# Patient Record
Sex: FEMALE | Race: WHITE | NOT HISPANIC OR LATINO | URBAN - METROPOLITAN AREA
[De-identification: names, ages, dates, MRNs, and addresses within clinical notes are randomized per-mention and may not be internally consistent; named-entity substitution may affect disease eponyms.]

---

## 2017-01-05 ENCOUNTER — APPOINTMENT (RX ONLY)
Dept: URBAN - METROPOLITAN AREA CLINIC 126 | Facility: CLINIC | Age: 67
Setting detail: DERMATOLOGY
End: 2017-01-05

## 2017-01-05 DIAGNOSIS — Z41.9 ENCOUNTER FOR PROCEDURE FOR PURPOSES OTHER THAN REMEDYING HEALTH STATE, UNSPECIFIED: ICD-10-CM

## 2017-01-05 PROCEDURE — ? FACIAL

## 2017-01-05 PROCEDURE — ? CHEMICAL PEEL

## 2017-01-05 PROCEDURE — ? DERMAPLANE

## 2017-01-05 ASSESSMENT — LOCATION DETAILED DESCRIPTION DERM
LOCATION DETAILED: SUPERIOR THORACIC SPINE
LOCATION DETAILED: LEFT CENTRAL MALAR CHEEK
LOCATION DETAILED: LEFT INFERIOR CENTRAL MALAR CHEEK

## 2017-01-05 ASSESSMENT — LOCATION ZONE DERM
LOCATION ZONE: TRUNK
LOCATION ZONE: FACE

## 2017-01-05 ASSESSMENT — LOCATION SIMPLE DESCRIPTION DERM
LOCATION SIMPLE: LEFT CHEEK
LOCATION SIMPLE: UPPER BACK

## 2017-01-05 NOTE — PROCEDURE: FACIAL
Mask Type (Optional): hydrating B5
Detail Level: Zone
Exfoliation Type: enzyme
Facial Steaming: steamed
Treatment Type (Optional): Spa Facial
Extraction Method: extractor

## 2017-01-05 NOTE — PROCEDURE: DERMAPLANE
Post-Procedure Instructions: Following the dermaplane procedure, Oxymist treatment was applied to the treatment areas. Moisturizer and SPF was applied.
Treatment Areas: face
Blade: [place] scalpel
Pre-Procedure Text: The patient was placed in a recumbant position on the procedure table.
Treatment Area Prep: alcohol
Detail Level: Zone
Post-Care Instructions: I reviewed with the patient in detail post-care instructions.

## 2017-01-05 NOTE — PROCEDURE: CHEMICAL PEEL
Prep: The treated area was degreased with pre-peel cleanser, and vaseline was applied for protection of mucous membranes.
Detail Level: Zone
Treatment Time (Optional): 5 min
Treatment Number: 0
Post Peel Care: After the procedure, phyto correct was applied to the treated areas. Sun protection and post-care instructions were reviewed with the patient.
Chemical Peel: MicroPeel Sensitive 2
Post-Care Instructions: I reviewed with the patient in detail post-care instructions. Patient should avoid sun exposure and wear sun protection.
Consent: Prior to the procedure, written consent was obtained and risks were reviewed, including but not limited to: redness, peeling, blistering, pigmentary change, scarring, infection, and pain.

## 2017-01-23 ENCOUNTER — APPOINTMENT (RX ONLY)
Dept: URBAN - METROPOLITAN AREA CLINIC 126 | Facility: CLINIC | Age: 67
Setting detail: DERMATOLOGY
End: 2017-01-23

## 2017-01-23 DIAGNOSIS — Z41.9 ENCOUNTER FOR PROCEDURE FOR PURPOSES OTHER THAN REMEDYING HEALTH STATE, UNSPECIFIED: ICD-10-CM

## 2017-01-23 PROCEDURE — ? VI PEEL

## 2017-01-23 ASSESSMENT — LOCATION SIMPLE DESCRIPTION DERM
LOCATION SIMPLE: CHEST
LOCATION SIMPLE: LEFT CHEEK

## 2017-01-23 ASSESSMENT — LOCATION ZONE DERM
LOCATION ZONE: TRUNK
LOCATION ZONE: FACE

## 2017-01-23 ASSESSMENT — LOCATION DETAILED DESCRIPTION DERM
LOCATION DETAILED: MIDDLE STERNUM
LOCATION DETAILED: LEFT INFERIOR CENTRAL MALAR CHEEK

## 2017-01-23 NOTE — PROCEDURE: VI PEEL
Post Peel Care: After the procedure, the patient was instructed not to wash the treated area for 6-8 hours or manually remove dead skin when the peeling process starts. Patient may use OTC hydrocortisone cream for itching. Patient instructed to use the provided Retin-A wipes on the treated area on the 1st and 2nd nights.
Post-Care Instructions: I reviewed with the patient in detail post-care instructions. Patient should avoid sun exposure and wear sun protection.
Prep: The treated area was degreased with pre-peel cleanser, and vaseline was applied for protection of mucous membranes.
Treatment Number: 0
Consent: Prior to the procedure, written consent was obtained and risks were reviewed, including but not limited to: redness, peeling, blistering, pigmentary change, scarring, infection, and pain. Patient is aware multiple treatments may be necessary to achieve the desired outcome.
Detail Level: Zone
Chemical Peel: VI Peel

## 2017-02-02 ENCOUNTER — APPOINTMENT (RX ONLY)
Dept: URBAN - METROPOLITAN AREA CLINIC 126 | Facility: CLINIC | Age: 67
Setting detail: DERMATOLOGY
End: 2017-02-02

## 2017-02-02 DIAGNOSIS — Z41.9 ENCOUNTER FOR PROCEDURE FOR PURPOSES OTHER THAN REMEDYING HEALTH STATE, UNSPECIFIED: ICD-10-CM

## 2017-02-02 PROCEDURE — ? CHEMICAL PEEL

## 2017-02-02 PROCEDURE — ? CHEMICAL PEEL GLYCOLIC ACID

## 2017-02-02 PROCEDURE — ? DERMAPLANE

## 2017-02-02 ASSESSMENT — LOCATION ZONE DERM
LOCATION ZONE: FACE
LOCATION ZONE: TRUNK

## 2017-02-02 ASSESSMENT — LOCATION SIMPLE DESCRIPTION DERM
LOCATION SIMPLE: LEFT UPPER BACK
LOCATION SIMPLE: LEFT CHEEK

## 2017-02-02 ASSESSMENT — LOCATION DETAILED DESCRIPTION DERM
LOCATION DETAILED: LEFT MID-UPPER BACK
LOCATION DETAILED: LEFT MEDIAL BUCCAL CHEEK
LOCATION DETAILED: LEFT INFERIOR CENTRAL MALAR CHEEK

## 2017-02-02 NOTE — PROCEDURE: DERMAPLANE
Treatment Area Prep: alcohol
Post-Care Instructions: I reviewed with the patient in detail post-care instructions.
Pre-Procedure Text: The patient was placed in a recumbant position on the procedure table.
Blade: [place] scalpel
Treatment Areas: face
Post-Procedure Instructions: Following the dermaplane procedure, Oxymist treatment was applied to the treatment areas. Moisturizer and SPF was applied.
Detail Level: Zone

## 2017-02-02 NOTE — PROCEDURE: CHEMICAL PEEL
Prep: The treated area was degreased with pre-peel cleanser, and vaseline was applied for protection of mucous membranes.
Chemical Peel: MicroPeel Sensitive 2
Treatment Time (Optional): 5 min
Treatment Number: 0
Post-Care Instructions: I reviewed with the patient in detail post-care instructions. Patient should avoid sun exposure and wear sun protection.
Consent: Prior to the procedure, written consent was obtained and risks were reviewed, including but not limited to: redness, peeling, blistering, pigmentary change, scarring, infection, and pain.
Detail Level: Zone
Post Peel Care: After the procedure, phyto correct was applied to the treated areas. Sun protection and post-care instructions were reviewed with the patient.

## 2017-02-02 NOTE — PROCEDURE: CHEMICAL PEEL GLYCOLIC ACID
Consent: Prior to the procedure, written consent was obtained and risks were reviewed, including but not limited to: redness, peeling, blistering, pigmentary change, scarring, infection, and pain.
Glycolic Acid %: 30%
Prep: The treated area was degreased with pre-peel cleanser, and vaseline was applied for protection of mucous membranes.
Post Peel Care: The peel was neutralized with sodium bicarbonate. After the procedure, the treatment area was washed with soap and water, and a post-peel cream was applied. Sun protection and post-care instructions were reviewed with the patient.
Post-Care Instructions: I reviewed with the patient in detail post-care instructions. Patient should avoid sun exposure and wear sun protection.
Number Of Passes (Layers): 2
Detail Level: Zone
Time (Mins): 5
Treatment Number: 0

## 2017-02-20 ENCOUNTER — APPOINTMENT (RX ONLY)
Dept: URBAN - METROPOLITAN AREA CLINIC 126 | Facility: CLINIC | Age: 67
Setting detail: DERMATOLOGY
End: 2017-02-20

## 2017-02-20 DIAGNOSIS — Z41.9 ENCOUNTER FOR PROCEDURE FOR PURPOSES OTHER THAN REMEDYING HEALTH STATE, UNSPECIFIED: ICD-10-CM

## 2017-02-20 PROCEDURE — ? VI PEEL

## 2017-02-20 ASSESSMENT — LOCATION DETAILED DESCRIPTION DERM: LOCATION DETAILED: LEFT MEDIAL SUPERIOR CHEST

## 2017-02-20 ASSESSMENT — LOCATION SIMPLE DESCRIPTION DERM: LOCATION SIMPLE: CHEST

## 2017-02-20 ASSESSMENT — LOCATION ZONE DERM: LOCATION ZONE: TRUNK

## 2017-02-20 NOTE — PROCEDURE: VI PEEL
Treatment Number: 0
Post-Care Instructions: I reviewed with the patient in detail post-care instructions. Patient should avoid sun exposure and wear sun protection.
Prep: The treated area was degreased with pre-peel cleanser, and vaseline was applied for protection of mucous membranes.
Chemical Peel: VI Peel
Detail Level: Zone
Consent: Prior to the procedure, written consent was obtained and risks were reviewed, including but not limited to: redness, peeling, blistering, pigmentary change, scarring, infection, and pain. Patient is aware multiple treatments may be necessary to achieve the desired outcome.
Post Peel Care: After the procedure, the patient was instructed not to wash the treated area for 6-8 hours or manually remove dead skin when the peeling process starts. Patient may use OTC hydrocortisone cream for itching. Patient instructed to use the provided Retin-A wipes on the treated area on the 1st and 2nd nights.

## 2017-03-06 ENCOUNTER — APPOINTMENT (RX ONLY)
Dept: URBAN - METROPOLITAN AREA CLINIC 126 | Facility: CLINIC | Age: 67
Setting detail: DERMATOLOGY
End: 2017-03-06

## 2017-03-06 DIAGNOSIS — D485 NEOPLASM OF UNCERTAIN BEHAVIOR OF SKIN: ICD-10-CM

## 2017-03-06 PROBLEM — D48.5 NEOPLASM OF UNCERTAIN BEHAVIOR OF SKIN: Status: ACTIVE | Noted: 2017-03-06

## 2017-03-06 PROCEDURE — ? BIOPSY BY SHAVE METHOD

## 2017-03-06 PROCEDURE — 11100: CPT

## 2017-03-06 ASSESSMENT — LOCATION DETAILED DESCRIPTION DERM: LOCATION DETAILED: LEFT CENTRAL EYEBROW

## 2017-03-06 ASSESSMENT — LOCATION ZONE DERM: LOCATION ZONE: FACE

## 2017-03-06 ASSESSMENT — LOCATION SIMPLE DESCRIPTION DERM: LOCATION SIMPLE: LEFT EYEBROW

## 2017-03-06 NOTE — PROCEDURE: MIPS QUALITY
Detail Level: Simple
Quality 47: Advance Care Plan: Advance Care Planning discussed and documented in the medical record; patient did not wish or was not able to name a surrogate decision maker or provide an advance care plan.
Quality 131: Pain Assessment And Follow-Up: Pain assessment using a standardized tool is documented as negative, no follow-up plan required
Quality 265: Biopsy Follow-Up: Biopsy results reviewed, communicated, tracked, and documented
Additional Notes: PQRS #47: It is the patients' responsibility to provide copies of the documents to the front staff to have scanned into their charts. \\nPQRS #110 and #111: Basia advises you to speak with your primary care physician in regards to flu shots and/or pneumonia vaccinations. \\nPQRS #226: Basiase advises you to reach out for support from the Centers for Disease Control and Prevention (CDC) on providing resources for smoking cessation programs or speak with your primary care physician. \\nPQRS #131: Basiase advises you to speak to your primary care provider for further evaluation.
Quality 110: Preventive Care And Screening: Influenza Immunization: Influenza Immunization previously received during influenza season
Quality 130: Documentation Of Current Medications In The Medical Record: Current Medications Documented
Quality 226: Preventive Care And Screening: Tobacco Use: Screening And Cessation Intervention: Patient screened for tobacco and is an ex-smoker
Quality 111:Pneumonia Vaccination Status For Older Adults: Pneumococcal Vaccination Previously Received

## 2017-03-13 ENCOUNTER — APPOINTMENT (RX ONLY)
Dept: URBAN - METROPOLITAN AREA CLINIC 126 | Facility: CLINIC | Age: 67
Setting detail: DERMATOLOGY
End: 2017-03-13

## 2017-03-13 DIAGNOSIS — Z41.9 ENCOUNTER FOR PROCEDURE FOR PURPOSES OTHER THAN REMEDYING HEALTH STATE, UNSPECIFIED: ICD-10-CM

## 2017-03-13 PROCEDURE — ? VI PEEL

## 2017-03-13 ASSESSMENT — LOCATION ZONE DERM
LOCATION ZONE: TRUNK
LOCATION ZONE: FACE

## 2017-03-13 ASSESSMENT — LOCATION SIMPLE DESCRIPTION DERM
LOCATION SIMPLE: LEFT CHEEK
LOCATION SIMPLE: CHEST

## 2017-03-13 ASSESSMENT — LOCATION DETAILED DESCRIPTION DERM
LOCATION DETAILED: UPPER STERNUM
LOCATION DETAILED: LEFT INFERIOR CENTRAL MALAR CHEEK

## 2017-03-13 NOTE — PROCEDURE: VI PEEL
Treatment Number: 4
Chemical Peel: VI Peel with booster
Post-Care Instructions: I reviewed with the patient in detail post-care instructions. Patient should avoid sun exposure and wear sun protection.
Prep: The treated area was degreased with pre-peel cleanser, and vaseline was applied for protection of mucous membranes.
Detail Level: Zone
Post Peel Care: After the procedure, the patient was instructed not to wash the treated area for 6-8 hours or manually remove dead skin when the peeling process starts. Patient may use OTC hydrocortisone cream for itching. Patient instructed to use the provided Retin-A wipes on the treated area on the 1st and 2nd nights.
Consent: Prior to the procedure, written consent was obtained and risks were reviewed, including but not limited to: redness, peeling, blistering, pigmentary change, scarring, infection, and pain. Patient is aware multiple treatments may be necessary to achieve the desired outcome.

## 2017-03-21 ENCOUNTER — APPOINTMENT (RX ONLY)
Dept: URBAN - METROPOLITAN AREA CLINIC 126 | Facility: CLINIC | Age: 67
Setting detail: DERMATOLOGY
End: 2017-03-21

## 2017-03-21 DIAGNOSIS — Z41.9 ENCOUNTER FOR PROCEDURE FOR PURPOSES OTHER THAN REMEDYING HEALTH STATE, UNSPECIFIED: ICD-10-CM

## 2017-03-21 PROCEDURE — ? DERMAPLANE

## 2017-03-21 PROCEDURE — ? CHEMICAL PEEL

## 2017-03-21 PROCEDURE — ? CHEMICAL PEEL GLYCOLIC ACID

## 2017-03-21 ASSESSMENT — LOCATION ZONE DERM
LOCATION ZONE: TRUNK
LOCATION ZONE: FACE

## 2017-03-21 ASSESSMENT — LOCATION DETAILED DESCRIPTION DERM
LOCATION DETAILED: LEFT CENTRAL MALAR CHEEK
LOCATION DETAILED: LEFT SUPERIOR MEDIAL BUCCAL CHEEK
LOCATION DETAILED: SUPERIOR THORACIC SPINE

## 2017-03-21 ASSESSMENT — LOCATION SIMPLE DESCRIPTION DERM
LOCATION SIMPLE: LEFT CHEEK
LOCATION SIMPLE: UPPER BACK

## 2017-03-21 NOTE — PROCEDURE: DERMAPLANE
Post-Procedure Instructions: Following the dermaplane procedure, Oxymist treatment was applied to the treatment areas. Moisturizer and SPF was applied.
Post-Care Instructions: I reviewed with the patient in detail post-care instructions.
Detail Level: Zone
Pre-Procedure Text: The patient was placed in a recumbant position on the procedure table.
Treatment Area Prep: alcohol
Blade: [place] scalpel
Treatment Areas: face

## 2017-03-21 NOTE — PROCEDURE: CHEMICAL PEEL GLYCOLIC ACID
Number Of Passes (Layers): 2
Post Peel Care: The peel was neutralized with sodium bicarbonate. After the procedure, the treatment area was washed with soap and water, and a post-peel cream was applied. Sun protection and post-care instructions were reviewed with the patient.
Treatment Number: 0
Glycolic Acid %: 50%
Detail Level: Zone
Prep: The treated area was degreased with pre-peel cleanser, and vaseline was applied for protection of mucous membranes.
Consent: Prior to the procedure, written consent was obtained and risks were reviewed, including but not limited to: redness, peeling, blistering, pigmentary change, scarring, infection, and pain.
Time (Mins): 5
Post-Care Instructions: I reviewed with the patient in detail post-care instructions. Patient should avoid sun exposure and wear sun protection.

## 2017-03-21 NOTE — PROCEDURE: CHEMICAL PEEL
Chemical Peel: MicroPeel Sensitive 2
Detail Level: Zone
Treatment Time (Optional): 5 min
Consent: Prior to the procedure, written consent was obtained and risks were reviewed, including but not limited to: redness, peeling, blistering, pigmentary change, scarring, infection, and pain.
Post Peel Care: After the procedure, phyto correct was applied to the treated areas. Sun protection and post-care instructions were reviewed with the patient.
Post-Care Instructions: I reviewed with the patient in detail post-care instructions. Patient should avoid sun exposure and wear sun protection.
Prep: The treated area was degreased with pre-peel cleanser, and vaseline was applied for protection of mucous membranes.
Treatment Number: 0

## 2017-03-24 ENCOUNTER — APPOINTMENT (RX ONLY)
Dept: URBAN - METROPOLITAN AREA CLINIC 126 | Facility: CLINIC | Age: 67
Setting detail: DERMATOLOGY
End: 2017-03-24

## 2017-03-24 ENCOUNTER — RX ONLY (OUTPATIENT)
Age: 67
Setting detail: RX ONLY
End: 2017-03-24

## 2017-03-24 DIAGNOSIS — L72.0 EPIDERMAL CYST: ICD-10-CM | Status: INADEQUATELY CONTROLLED

## 2017-03-24 DIAGNOSIS — Z01.818 ENCOUNTER FOR OTHER PREPROCEDURAL EXAMINATION: ICD-10-CM

## 2017-03-24 PROBLEM — D48.5 NEOPLASM OF UNCERTAIN BEHAVIOR OF SKIN: Status: ACTIVE | Noted: 2017-03-24

## 2017-03-24 PROCEDURE — ? COUNSELING

## 2017-03-24 PROCEDURE — ? OTHER

## 2017-03-24 PROCEDURE — 99213 OFFICE O/P EST LOW 20 MIN: CPT

## 2017-03-24 RX ORDER — CLINDAMYCIN HYDROCHLORIDE 300 MG/1
CAPSULE ORAL
Qty: 2 | Refills: 0 | Status: ERX | COMMUNITY
Start: 2017-03-24

## 2017-03-24 NOTE — PROCEDURE: MIPS QUALITY
Detail Level: Simple
Quality 47: Advance Care Plan: Advance Care Planning discussed and documented in the medical record; patient did not wish or was not able to name a surrogate decision maker or provide an advance care plan.
Quality 111:Pneumonia Vaccination Status For Older Adults: Pneumococcal Vaccination Previously Received
Quality 110: Preventive Care And Screening: Influenza Immunization: Influenza Immunization previously received during influenza season
Quality 131: Pain Assessment And Follow-Up: Pain assessment using a standardized tool is documented as negative, no follow-up plan required
Quality 130: Documentation Of Current Medications In The Medical Record: Current Medications Documented
Additional Notes: PQRS #47: It is the patients' responsibility to provide copies of the documents to the front staff to have scanned into their charts. \\nPQRS #110 and #111: Basia advises you to speak with your primary care physician in regards to flu shots and/or pneumonia vaccinations. \\nPQRS #226: Basiase advises you to reach out for support from the Centers for Disease Control and Prevention (CDC) on providing resources for smoking cessation programs or speak with your primary care physician. \\nPQRS #131: Basiase advises you to speak to your primary care provider for further evaluation.
Quality 226: Preventive Care And Screening: Tobacco Use: Screening And Cessation Intervention: Patient screened for tobacco and is an ex-smoker

## 2017-04-06 ENCOUNTER — APPOINTMENT (RX ONLY)
Dept: URBAN - METROPOLITAN AREA CLINIC 126 | Facility: CLINIC | Age: 67
Setting detail: DERMATOLOGY
End: 2017-04-06

## 2017-04-06 DIAGNOSIS — Z41.9 ENCOUNTER FOR PROCEDURE FOR PURPOSES OTHER THAN REMEDYING HEALTH STATE, UNSPECIFIED: ICD-10-CM

## 2017-04-06 PROCEDURE — ? DERMAPLANE

## 2017-04-06 PROCEDURE — ? CHEMICAL PEEL GLYCOLIC ACID

## 2017-04-06 PROCEDURE — ? CHEMICAL PEEL

## 2017-04-06 ASSESSMENT — LOCATION SIMPLE DESCRIPTION DERM
LOCATION SIMPLE: UPPER BACK
LOCATION SIMPLE: LEFT CHEEK

## 2017-04-06 ASSESSMENT — LOCATION DETAILED DESCRIPTION DERM
LOCATION DETAILED: LEFT INFERIOR CENTRAL MALAR CHEEK
LOCATION DETAILED: LEFT CENTRAL BUCCAL CHEEK
LOCATION DETAILED: SUPERIOR THORACIC SPINE

## 2017-04-06 ASSESSMENT — LOCATION ZONE DERM
LOCATION ZONE: FACE
LOCATION ZONE: TRUNK

## 2017-04-06 NOTE — PROCEDURE: DERMAPLANE
Blade: [place] scalpel
Treatment Area Prep: alcohol
Detail Level: Zone
Treatment Areas: face
Pre-Procedure Text: The patient was placed in a recumbant position on the procedure table.
Post-Procedure Instructions: Following the dermaplane procedure, Oxymist treatment was applied to the treatment areas. Moisturizer and SPF was applied.
Post-Care Instructions: I reviewed with the patient in detail post-care instructions.

## 2017-04-06 NOTE — PROCEDURE: CHEMICAL PEEL GLYCOLIC ACID
Detail Level: Zone
Glycolic Acid %: 50%
Treatment Number: 0
Post Peel Care: The peel was neutralized with sodium bicarbonate. After the procedure, the treatment area was washed with soap and water, and a post-peel cream was applied. Sun protection and post-care instructions were reviewed with the patient.
Number Of Passes (Layers): 2
Consent: Prior to the procedure, written consent was obtained and risks were reviewed, including but not limited to: redness, peeling, blistering, pigmentary change, scarring, infection, and pain.
Time (Mins): 5
Prep: The treated area was degreased with pre-peel cleanser, and vaseline was applied for protection of mucous membranes.
Post-Care Instructions: I reviewed with the patient in detail post-care instructions. Patient should avoid sun exposure and wear sun protection.

## 2017-04-06 NOTE — PROCEDURE: CHEMICAL PEEL
Post Peel Care: After the procedure, phyto correct was applied to the treated areas. Sun protection and post-care instructions were reviewed with the patient.
Chemical Peel: MicroPeel Sensitive 2
Post-Care Instructions: I reviewed with the patient in detail post-care instructions. Patient should avoid sun exposure and wear sun protection.
Prep: The treated area was degreased with pre-peel cleanser, and vaseline was applied for protection of mucous membranes.
Treatment Time (Optional): 5 min
Consent: Prior to the procedure, written consent was obtained and risks were reviewed, including but not limited to: redness, peeling, blistering, pigmentary change, scarring, infection, and pain.
Treatment Number: 0
Detail Level: Zone

## 2017-04-13 ENCOUNTER — APPOINTMENT (RX ONLY)
Dept: URBAN - METROPOLITAN AREA CLINIC 126 | Facility: CLINIC | Age: 67
Setting detail: DERMATOLOGY
End: 2017-04-13

## 2017-04-13 DIAGNOSIS — L30.8 OTHER SPECIFIED DERMATITIS: ICD-10-CM

## 2017-04-13 PROCEDURE — ? PRESCRIPTION

## 2017-04-13 PROCEDURE — 99212 OFFICE O/P EST SF 10 MIN: CPT

## 2017-04-13 PROCEDURE — ? COUNSELING

## 2017-04-13 RX ORDER — DOXYCYCLINE HYCLATE 100 MG/1
1 CAPSULE CAPSULE, GELATIN COATED ORAL BID
Qty: 14 | Refills: 0 | Status: ERX | COMMUNITY
Start: 2017-04-13

## 2017-04-13 RX ADMIN — DOXYCYCLINE HYCLATE 1 CAPSULE: 100 CAPSULE, GELATIN COATED ORAL at 00:00

## 2017-05-04 ENCOUNTER — APPOINTMENT (RX ONLY)
Dept: URBAN - METROPOLITAN AREA CLINIC 126 | Facility: CLINIC | Age: 67
Setting detail: DERMATOLOGY
End: 2017-05-04

## 2017-05-04 DIAGNOSIS — Z41.9 ENCOUNTER FOR PROCEDURE FOR PURPOSES OTHER THAN REMEDYING HEALTH STATE, UNSPECIFIED: ICD-10-CM

## 2017-05-04 PROCEDURE — ? CHEMICAL PEEL

## 2017-05-04 PROCEDURE — ? FACIAL

## 2017-05-04 PROCEDURE — ? DERMAPLANE

## 2017-05-04 ASSESSMENT — LOCATION DETAILED DESCRIPTION DERM
LOCATION DETAILED: LEFT INFERIOR MEDIAL MALAR CHEEK
LOCATION DETAILED: RIGHT SUPERIOR MEDIAL UPPER BACK
LOCATION DETAILED: LEFT SUPERIOR MEDIAL BUCCAL CHEEK

## 2017-05-04 ASSESSMENT — LOCATION ZONE DERM
LOCATION ZONE: FACE
LOCATION ZONE: TRUNK

## 2017-05-04 ASSESSMENT — LOCATION SIMPLE DESCRIPTION DERM
LOCATION SIMPLE: LEFT CHEEK
LOCATION SIMPLE: RIGHT UPPER BACK

## 2017-05-04 NOTE — PROCEDURE: CHEMICAL PEEL
Consent: Prior to the procedure, written consent was obtained and risks were reviewed, including but not limited to: redness, peeling, blistering, pigmentary change, scarring, infection, and pain.
Post-Care Instructions: I reviewed with the patient in detail post-care instructions. Patient should avoid sun exposure and wear sun protection.
Treatment Number: 0
Treatment Time (Optional): 5 min
Post Peel Care: After the procedure, phyto correct was applied to the treated areas. Sun protection and post-care instructions were reviewed with the patient.
Detail Level: Zone
Chemical Peel: MicroPeel Sensitive 2
Prep: The treated area was degreased with pre-peel cleanser, and vaseline was applied for protection of mucous membranes.

## 2017-05-04 NOTE — PROCEDURE: DERMAPLANE
Pre-Procedure Text: The patient was placed in a recumbant position on the procedure table.
Treatment Area Prep: alcohol
Detail Level: Zone
Treatment Areas: face
Blade: [place] scalpel
Post-Procedure Instructions: Following the dermaplane procedure, Oxymist treatment was applied to the treatment areas. Moisturizer and SPF was applied.
Post-Care Instructions: I reviewed with the patient in detail post-care instructions.

## 2017-05-04 NOTE — PROCEDURE: FACIAL
Detail Level: Zone
Extraction Method: extractor
Facial Steaming: steamed
Exfoliation Type: enzyme
Treatment Type (Optional): Spa Facial

## 2017-05-26 ENCOUNTER — APPOINTMENT (RX ONLY)
Dept: URBAN - METROPOLITAN AREA CLINIC 126 | Facility: CLINIC | Age: 67
Setting detail: DERMATOLOGY
End: 2017-05-26

## 2017-05-26 DIAGNOSIS — Z41.9 ENCOUNTER FOR PROCEDURE FOR PURPOSES OTHER THAN REMEDYING HEALTH STATE, UNSPECIFIED: ICD-10-CM

## 2017-05-26 PROCEDURE — ? BOTOX

## 2017-05-26 PROCEDURE — ? FILLERS

## 2017-05-26 NOTE — PROCEDURE: BOTOX
Additional Area 3 Units: 0
Dilution (U/0.1 Cc): 1
Lot #: [id number]
Detail Level: Simple
Post-Care Instructions: Patient instructed to not lie down for 4 hours and limit physical activity for 24 hours. Patient instructed not to travel by airplane for 48 hours.
Glabellar Complex Units: [id number] [address]
Consent: Written consent obtained. Risks include but not limited to lid/brow ptosis, bruising, swelling, diplopia, temporary effect, incomplete chemical denervation.

## 2017-05-26 NOTE — PROCEDURE: FILLERS
Additional Area 3 Volume In Cc: 0
Detail Level: Simple
Map Statment: See [address] for Complete Details
Additional Area 1 Location: Corners of mouth
Consent: Written consent obtained. Risks include but not limited to bruising, beading, irregular texture, ulceration, infection, allergic reaction, scar formation, incomplete augmentation, temporary nature, procedural pain.
Nasolabial Folds Filler Volume In Cc: 0.5
Post-Care Instructions: Patient instructed to apply ice to reduce swelling.
Use Map Statement For Sites (Optional): No
Filler: Juvederm Ultra

## 2017-09-18 ENCOUNTER — HOSPITAL ENCOUNTER (OUTPATIENT)
Dept: GENERAL RADIOLOGY | Age: 67
Discharge: HOME | End: 2017-09-18

## 2017-09-18 ENCOUNTER — HOSPITAL ENCOUNTER (OUTPATIENT)
Dept: OTHER | Age: 67
Discharge: HOME | End: 2017-09-18
Attending: INTERNAL MEDICINE | Admitting: INTERNAL MEDICINE

## 2017-09-18 DIAGNOSIS — R05.3 CHRONIC COUGH: ICD-10-CM

## 2017-11-02 ENCOUNTER — APPOINTMENT (RX ONLY)
Dept: URBAN - METROPOLITAN AREA CLINIC 126 | Facility: CLINIC | Age: 67
Setting detail: DERMATOLOGY
End: 2017-11-02

## 2017-11-02 DIAGNOSIS — Z41.9 ENCOUNTER FOR PROCEDURE FOR PURPOSES OTHER THAN REMEDYING HEALTH STATE, UNSPECIFIED: ICD-10-CM

## 2017-11-02 PROCEDURE — ? CHEMICAL PEEL

## 2017-11-02 PROCEDURE — ? DERMAPLANE

## 2017-11-02 PROCEDURE — ? CHEMICAL PEEL GLYCOLIC ACID

## 2017-11-02 ASSESSMENT — LOCATION SIMPLE DESCRIPTION DERM
LOCATION SIMPLE: RIGHT UPPER BACK
LOCATION SIMPLE: LEFT CHEEK

## 2017-11-02 ASSESSMENT — LOCATION ZONE DERM
LOCATION ZONE: FACE
LOCATION ZONE: TRUNK

## 2017-11-02 ASSESSMENT — LOCATION DETAILED DESCRIPTION DERM
LOCATION DETAILED: RIGHT MEDIAL UPPER BACK
LOCATION DETAILED: LEFT CENTRAL MALAR CHEEK
LOCATION DETAILED: LEFT INFERIOR CENTRAL MALAR CHEEK

## 2017-11-02 NOTE — PROCEDURE: CHEMICAL PEEL
Chemical Peel: Micropeel 20 Solution
Consent: Prior to the procedure, written consent was obtained and risks were reviewed, including but not limited to: redness, peeling, blistering, pigmentary change, scarring, infection, and pain.
Post Peel Care: After the procedure, phyto correct was applied to the treated areas. Sun protection and post-care instructions were reviewed with the patient.
Detail Level: Zone
Prep: The treated area was degreased with pre-peel cleanser, and vaseline was applied for protection of mucous membranes.
Post-Care Instructions: I reviewed with the patient in detail post-care instructions. Patient should avoid sun exposure and wear sun protection.
Treatment Number: 0
Treatment Time (Optional): 5 min

## 2017-11-02 NOTE — PROCEDURE: DERMAPLANE
Treatment Areas: face
Post-Procedure Instructions: Following the dermaplane procedure, Oxymist treatment was applied to the treatment areas. Moisturizer and SPF was applied.
Pre-Procedure Text: The patient was placed in a recumbant position on the procedure table.
Post-Care Instructions: I reviewed with the patient in detail post-care instructions.
Detail Level: Zone
Blade: [place] scalpel
Treatment Area Prep: alcohol

## 2017-11-02 NOTE — PROCEDURE: CHEMICAL PEEL GLYCOLIC ACID
Time (Mins): 3
Glycolic Acid %: 50%
Detail Level: Zone
Prep: The treated area was degreased with pre-peel cleanser, and vaseline was applied for protection of mucous membranes.
Post-Care Instructions: I reviewed with the patient in detail post-care instructions. Patient should avoid sun exposure and wear sun protection.
Post Peel Care: The peel was neutralized with sodium bicarbonate. After the procedure, the treatment area was washed with soap and water, and a post-peel cream was applied. Sun protection and post-care instructions were reviewed with the patient.
Number Of Passes (Layers): 1
Treatment Number: 0
Consent: Prior to the procedure, written consent was obtained and risks were reviewed, including but not limited to: redness, peeling, blistering, pigmentary change, scarring, infection, and pain.

## 2017-11-13 ENCOUNTER — APPOINTMENT (RX ONLY)
Dept: URBAN - METROPOLITAN AREA CLINIC 126 | Facility: CLINIC | Age: 67
Setting detail: DERMATOLOGY
End: 2017-11-13

## 2017-11-13 DIAGNOSIS — Z41.9 ENCOUNTER FOR PROCEDURE FOR PURPOSES OTHER THAN REMEDYING HEALTH STATE, UNSPECIFIED: ICD-10-CM

## 2017-11-13 PROCEDURE — ? ULTHERAPY

## 2017-11-13 NOTE — PROCEDURE: ULTHERAPY
Pre-Procedures Photographs: Yes
Anesthesia Volume In Cc: 6
Consent: Written consent obtained, risks reviewed including but not limited to crusting, scabbing, blistering, scarring, darker or lighter pigmentary change, and/or incomplete improvement.
Use Distraction Techniques In Note: No
Total Lines (Use Numbers Only, No Special Characters Or $): [employer]
Anesthesia Type: 1% lidocaine with epinephrine
Patient Reported Discomfort: 0
Detail Level: Detailed
Post-Care Instructions: I reviewed with the patient in detail post-care instructions. Patient should stay away from the sun and wear sun protection until treated areas are fully healed.

## 2017-12-07 ENCOUNTER — APPOINTMENT (RX ONLY)
Dept: URBAN - METROPOLITAN AREA CLINIC 126 | Facility: CLINIC | Age: 67
Setting detail: DERMATOLOGY
End: 2017-12-07

## 2017-12-07 DIAGNOSIS — Z41.9 ENCOUNTER FOR PROCEDURE FOR PURPOSES OTHER THAN REMEDYING HEALTH STATE, UNSPECIFIED: ICD-10-CM

## 2017-12-07 PROCEDURE — ? CHEMICAL PEEL GLYCOLIC ACID

## 2017-12-07 PROCEDURE — ? DERMAPLANE

## 2017-12-07 PROCEDURE — ? CHEMICAL PEEL

## 2017-12-07 ASSESSMENT — LOCATION ZONE DERM
LOCATION ZONE: TRUNK
LOCATION ZONE: FACE

## 2017-12-07 ASSESSMENT — LOCATION DETAILED DESCRIPTION DERM
LOCATION DETAILED: SUPERIOR THORACIC SPINE
LOCATION DETAILED: LEFT INFERIOR CENTRAL MALAR CHEEK

## 2017-12-07 ASSESSMENT — LOCATION SIMPLE DESCRIPTION DERM
LOCATION SIMPLE: UPPER BACK
LOCATION SIMPLE: LEFT CHEEK

## 2017-12-07 NOTE — PROCEDURE: DERMAPLANE
Treatment Areas: face
Treatment Area Prep: alcohol
Post-Care Instructions: I reviewed with the patient in detail post-care instructions.
Blade: [place] scalpel
Post-Procedure Instructions: Following the dermaplane procedure, Oxymist treatment was applied to the treatment areas. Moisturizer and SPF was applied.
Detail Level: Zone
Pre-Procedure Text: The patient was placed in a recumbant position on the procedure table.

## 2017-12-07 NOTE — PROCEDURE: CHEMICAL PEEL
Treatment Number: 0
Treatment Time (Optional): 5 min
Chemical Peel: Micropeel Plus 20 Solution
Post Peel Care: After the procedure, phyto correct was applied to the treated areas. Sun protection and post-care instructions were reviewed with the patient.
Detail Level: Zone
Consent: Prior to the procedure, written consent was obtained and risks were reviewed, including but not limited to: redness, peeling, blistering, pigmentary change, scarring, infection, and pain.
Prep: The treated area was degreased with pre-peel cleanser, and vaseline was applied for protection of mucous membranes.
Post-Care Instructions: I reviewed with the patient in detail post-care instructions. Patient should avoid sun exposure and wear sun protection.

## 2017-12-07 NOTE — PROCEDURE: CHEMICAL PEEL GLYCOLIC ACID
Time (Mins): 3
Number Of Passes (Layers): 1
Prep: The treated area was degreased with pre-peel cleanser, and vaseline was applied for protection of mucous membranes.
Post Peel Care: The peel was neutralized with sodium bicarbonate. After the procedure, the treatment area was washed with soap and water, and a post-peel cream was applied. Sun protection and post-care instructions were reviewed with the patient.
Consent: Prior to the procedure, written consent was obtained and risks were reviewed, including but not limited to: redness, peeling, blistering, pigmentary change, scarring, infection, and pain.
Detail Level: Zone
Treatment Number: 0
Glycolic Acid %: 50%
Post-Care Instructions: I reviewed with the patient in detail post-care instructions. Patient should avoid sun exposure and wear sun protection.

## 2018-01-04 ENCOUNTER — APPOINTMENT (RX ONLY)
Dept: URBAN - METROPOLITAN AREA CLINIC 126 | Facility: CLINIC | Age: 68
Setting detail: DERMATOLOGY
End: 2018-01-04

## 2018-01-04 DIAGNOSIS — Z41.9 ENCOUNTER FOR PROCEDURE FOR PURPOSES OTHER THAN REMEDYING HEALTH STATE, UNSPECIFIED: ICD-10-CM

## 2018-01-04 PROCEDURE — ? CHEMICAL PEEL

## 2018-01-04 PROCEDURE — ? CHEMICAL PEEL GLYCOLIC ACID

## 2018-01-04 PROCEDURE — ? DERMAPLANE

## 2018-01-04 ASSESSMENT — LOCATION SIMPLE DESCRIPTION DERM
LOCATION SIMPLE: LEFT UPPER BACK
LOCATION SIMPLE: LEFT CHEEK

## 2018-01-04 ASSESSMENT — LOCATION DETAILED DESCRIPTION DERM
LOCATION DETAILED: LEFT MEDIAL UPPER BACK
LOCATION DETAILED: LEFT INFERIOR CENTRAL MALAR CHEEK
LOCATION DETAILED: LEFT CENTRAL MALAR CHEEK

## 2018-01-04 ASSESSMENT — LOCATION ZONE DERM
LOCATION ZONE: FACE
LOCATION ZONE: TRUNK

## 2018-01-04 NOTE — PROCEDURE: CHEMICAL PEEL
Post-Care Instructions: I reviewed with the patient in detail post-care instructions. Patient should avoid sun exposure and wear sun protection.
Chemical Peel: MicroPeel Sensitive 2
Detail Level: Zone
Treatment Number: 0
Consent: Prior to the procedure, written consent was obtained and risks were reviewed, including but not limited to: redness, peeling, blistering, pigmentary change, scarring, infection, and pain.
Prep: The treated area was degreased with pre-peel cleanser, and vaseline was applied for protection of mucous membranes.
Post Peel Care: After the procedure, phyto correct was applied to the treated areas. Sun protection and post-care instructions were reviewed with the patient.
Treatment Time (Optional): 5 min

## 2018-01-04 NOTE — PROCEDURE: DERMAPLANE
Treatment Area Prep: alcohol
Blade: [place] scalpel
Detail Level: Zone
Pre-Procedure Text: The patient was placed in a recumbant position on the procedure table.
Treatment Areas: face
Post-Care Instructions: I reviewed with the patient in detail post-care instructions.
Post-Procedure Instructions: Following the dermaplane procedure, Oxymist treatment was applied to the treatment areas. Moisturizer and SPF was applied.

## 2018-02-01 ENCOUNTER — APPOINTMENT (RX ONLY)
Dept: URBAN - METROPOLITAN AREA CLINIC 126 | Facility: CLINIC | Age: 68
Setting detail: DERMATOLOGY
End: 2018-02-01

## 2018-02-01 DIAGNOSIS — Z41.9 ENCOUNTER FOR PROCEDURE FOR PURPOSES OTHER THAN REMEDYING HEALTH STATE, UNSPECIFIED: ICD-10-CM

## 2018-02-01 PROCEDURE — ? DERMAPLANE

## 2018-02-01 PROCEDURE — ? CHEMICAL PEEL GLYCOLIC ACID

## 2018-02-01 PROCEDURE — ? CHEMICAL PEEL

## 2018-02-01 ASSESSMENT — LOCATION DETAILED DESCRIPTION DERM
LOCATION DETAILED: SUPERIOR THORACIC SPINE
LOCATION DETAILED: LEFT INFERIOR CENTRAL MALAR CHEEK
LOCATION DETAILED: LEFT MEDIAL MALAR CHEEK

## 2018-02-01 ASSESSMENT — LOCATION SIMPLE DESCRIPTION DERM
LOCATION SIMPLE: LEFT CHEEK
LOCATION SIMPLE: UPPER BACK

## 2018-02-01 ASSESSMENT — LOCATION ZONE DERM
LOCATION ZONE: FACE
LOCATION ZONE: TRUNK

## 2018-02-01 NOTE — PROCEDURE: DERMAPLANE
Treatment Areas: face
Treatment Area Prep: alcohol
Blade: [place] scalpel
Pre-Procedure Text: The patient was placed in a recumbant position on the procedure table.
Detail Level: Zone
Post-Care Instructions: I reviewed with the patient in detail post-care instructions.
Post-Procedure Instructions: Following the dermaplane procedure, Oxymist treatment was applied to the treatment areas. Moisturizer and SPF was applied.

## 2018-02-01 NOTE — PROCEDURE: CHEMICAL PEEL
Post Peel Care: After the procedure, phyto correct was applied to the treated areas. Sun protection and post-care instructions were reviewed with the patient.
Detail Level: Zone
Post-Care Instructions: I reviewed with the patient in detail post-care instructions. Patient should avoid sun exposure and wear sun protection.
Treatment Number: 0
Prep: The treated area was degreased with pre-peel cleanser, and vaseline was applied for protection of mucous membranes.
Treatment Time (Optional): 5 min
Chemical Peel: MicroPeel Sensitive 2
Consent: Prior to the procedure, written consent was obtained and risks were reviewed, including but not limited to: redness, peeling, blistering, pigmentary change, scarring, infection, and pain.

## 2018-02-01 NOTE — PROCEDURE: CHEMICAL PEEL GLYCOLIC ACID
Post-Care Instructions: I reviewed with the patient in detail post-care instructions. Patient should avoid sun exposure and wear sun protection.
Number Of Passes (Layers): 1
Time (Mins): 3
Glycolic Acid %: 30%
Detail Level: Zone
Prep: The treated area was degreased with pre-peel cleanser, and vaseline was applied for protection of mucous membranes.
Consent: Prior to the procedure, written consent was obtained and risks were reviewed, including but not limited to: redness, peeling, blistering, pigmentary change, scarring, infection, and pain.
Post Peel Care: The peel was neutralized with sodium bicarbonate. After the procedure, the treatment area was washed with soap and water, and a post-peel cream was applied. Sun protection and post-care instructions were reviewed with the patient.
Treatment Number: 0

## 2018-02-12 NOTE — PROCEDURE: BIOPSY BY SHAVE METHOD
Spontaneous, unlabored and symmetrical
Lab Facility: [id number]
Bill For Surgical Tray: no
Additional Anesthesia Volume In Cc (Will Not Render If 0): 0
Type Of Destruction Used: Curettage
Lab: [facility] [place]
Hemostasis: Aluminum Chloride
Billing Type: [employer]
Consent: Written consent was obtained and risks were reviewed including but not limited to scarring, infection, bleeding, scabbing, incomplete removal, nerve damage and allergy to anesthesia.
Dressing: bandage
Detail Level: Simple
Anesthesia Type: 1% lidocaine with epinephrine
Curettage Text: The wound bed was treated with curettage after the biopsy was performed.
Size Of Lesion In Cm: 0.5
Biopsy Type: H and E
Cryotherapy Text: The wound bed was treated with cryotherapy after the biopsy was performed.
Wound Care: Polysporin ointment
Render Post-Care Instructions In Note?: yes
Notification Instructions: Patient will be notified of biopsy results. However, patient instructed to call the office if not contacted within 2 weeks.
Electrodesiccation And Curettage Text: The wound bed was treated with electrodesiccation and curettage after the biopsy was performed.
Post-Care Instructions: Post procedure handout was reviewed and given to the patient.
Electrodesiccation Text: The wound bed was treated with electrodesiccation after the biopsy was performed.
Biopsy Method: Double edge Personna blades
Body Location Override (Optional - Billing Will Still Be Based On Selected Body Map Location If Applicable): Left eyelid
Silver Nitrate Text: The wound bed was treated with silver nitrate after the biopsy was performed.

## 2018-03-08 ENCOUNTER — APPOINTMENT (RX ONLY)
Dept: URBAN - METROPOLITAN AREA CLINIC 126 | Facility: CLINIC | Age: 68
Setting detail: DERMATOLOGY
End: 2018-03-08

## 2018-03-08 DIAGNOSIS — Z41.9 ENCOUNTER FOR PROCEDURE FOR PURPOSES OTHER THAN REMEDYING HEALTH STATE, UNSPECIFIED: ICD-10-CM

## 2018-03-08 PROCEDURE — ? CHEMICAL PEEL

## 2018-03-08 PROCEDURE — ? CHEMICAL PEEL GLYCOLIC ACID

## 2018-03-08 PROCEDURE — ? DERMAPLANE

## 2018-03-08 ASSESSMENT — LOCATION DETAILED DESCRIPTION DERM
LOCATION DETAILED: SUPERIOR THORACIC SPINE
LOCATION DETAILED: LEFT INFERIOR CENTRAL MALAR CHEEK

## 2018-03-08 ASSESSMENT — LOCATION SIMPLE DESCRIPTION DERM
LOCATION SIMPLE: LEFT CHEEK
LOCATION SIMPLE: UPPER BACK

## 2018-03-08 ASSESSMENT — LOCATION ZONE DERM
LOCATION ZONE: TRUNK
LOCATION ZONE: FACE

## 2018-03-08 NOTE — PROCEDURE: CHEMICAL PEEL GLYCOLIC ACID
Number Of Passes (Layers): 1
Time (Mins): 3
Treatment Number: 0
Consent: Prior to the procedure, written consent was obtained and risks were reviewed, including but not limited to: redness, peeling, blistering, pigmentary change, scarring, infection, and pain.
Glycolic Acid %: 50%
Post-Care Instructions: I reviewed with the patient in detail post-care instructions. Patient should avoid sun exposure and wear sun protection.
Detail Level: Zone
Post Peel Care: The peel was neutralized with sodium bicarbonate. After the procedure, the treatment area was washed with soap and water, and a post-peel cream was applied. Sun protection and post-care instructions were reviewed with the patient.
Prep: The treated area was degreased with pre-peel cleanser, and vaseline was applied for protection of mucous membranes.

## 2018-03-08 NOTE — PROCEDURE: CHEMICAL PEEL
Post Peel Care: After the procedure, phyto correct was applied to the treated areas. Sun protection and post-care instructions were reviewed with the patient.
Treatment Time (Optional): 5 min
Prep: The treated area was degreased with pre-peel cleanser, and vaseline was applied for protection of mucous membranes.
Treatment Number: 0
Detail Level: Zone
Chemical Peel: MicroPeel Sensitive 2
Consent: Prior to the procedure, written consent was obtained and risks were reviewed, including but not limited to: redness, peeling, blistering, pigmentary change, scarring, infection, and pain.
Post-Care Instructions: I reviewed with the patient in detail post-care instructions. Patient should avoid sun exposure and wear sun protection.

## 2018-03-08 NOTE — PROCEDURE: DERMAPLANE
Treatment Area Prep: alcohol
Treatment Areas: face
Detail Level: Zone
Post-Procedure Instructions: Following the dermaplane procedure, Oxymist treatment was applied to the treatment areas. Moisturizer and SPF was applied.
Blade: [place] scalpel
Pre-Procedure Text: The patient was placed in a recumbant position on the procedure table.
Post-Care Instructions: I reviewed with the patient in detail post-care instructions.

## 2018-04-05 ENCOUNTER — APPOINTMENT (RX ONLY)
Dept: URBAN - METROPOLITAN AREA CLINIC 126 | Facility: CLINIC | Age: 68
Setting detail: DERMATOLOGY
End: 2018-04-05

## 2018-04-05 DIAGNOSIS — Z41.9 ENCOUNTER FOR PROCEDURE FOR PURPOSES OTHER THAN REMEDYING HEALTH STATE, UNSPECIFIED: ICD-10-CM

## 2018-04-05 PROCEDURE — ? CHEMICAL PEEL

## 2018-04-05 PROCEDURE — ? DERMAPLANE

## 2018-04-05 PROCEDURE — ? CHEMICAL PEEL GLYCOLIC ACID

## 2018-04-05 ASSESSMENT — LOCATION ZONE DERM
LOCATION ZONE: FACE
LOCATION ZONE: TRUNK

## 2018-04-05 ASSESSMENT — LOCATION DETAILED DESCRIPTION DERM
LOCATION DETAILED: LEFT INFERIOR CENTRAL MALAR CHEEK
LOCATION DETAILED: SUPERIOR THORACIC SPINE

## 2018-04-05 ASSESSMENT — LOCATION SIMPLE DESCRIPTION DERM
LOCATION SIMPLE: LEFT CHEEK
LOCATION SIMPLE: UPPER BACK

## 2018-04-05 NOTE — PROCEDURE: DERMAPLANE
Treatment Areas: face
Post-Care Instructions: I reviewed with the patient in detail post-care instructions.
Post-Procedure Instructions: Following the dermaplane procedure, Oxymist treatment was applied to the treatment areas. Moisturizer and SPF was applied.
Blade: [place] scalpel
Treatment Area Prep: alcohol
Detail Level: Zone
Pre-Procedure Text: The patient was placed in a recumbant position on the procedure table.

## 2018-04-05 NOTE — PROCEDURE: CHEMICAL PEEL GLYCOLIC ACID
Prep: The treated area was degreased with pre-peel cleanser, and vaseline was applied for protection of mucous membranes.
Post Peel Care: The peel was neutralized with sodium bicarbonate. After the procedure, the treatment area was washed with soap and water, and a post-peel cream was applied. Sun protection and post-care instructions were reviewed with the patient.
Consent: Prior to the procedure, written consent was obtained and risks were reviewed, including but not limited to: redness, peeling, blistering, pigmentary change, scarring, infection, and pain.
Glycolic Acid %: 50%
Number Of Passes (Layers): 1
Post-Care Instructions: I reviewed with the patient in detail post-care instructions. Patient should avoid sun exposure and wear sun protection.
Time (Mins): 3
Treatment Number: 0
Detail Level: Zone

## 2018-04-05 NOTE — PROCEDURE: CHEMICAL PEEL
Treatment Number: 0
Prep: The treated area was degreased with pre-peel cleanser, and vaseline was applied for protection of mucous membranes.
Chemical Peel: MicroPeel Sensitive 2
Consent: Prior to the procedure, written consent was obtained and risks were reviewed, including but not limited to: redness, peeling, blistering, pigmentary change, scarring, infection, and pain.
Treatment Time (Optional): 5 min
Post-Care Instructions: I reviewed with the patient in detail post-care instructions. Patient should avoid sun exposure and wear sun protection.
Post Peel Care: After the procedure, phyto correct was applied to the treated areas. Sun protection and post-care instructions were reviewed with the patient.
Detail Level: Zone

## 2018-05-10 ENCOUNTER — APPOINTMENT (RX ONLY)
Dept: URBAN - METROPOLITAN AREA CLINIC 126 | Facility: CLINIC | Age: 68
Setting detail: DERMATOLOGY
End: 2018-05-10

## 2018-05-10 DIAGNOSIS — Z41.9 ENCOUNTER FOR PROCEDURE FOR PURPOSES OTHER THAN REMEDYING HEALTH STATE, UNSPECIFIED: ICD-10-CM

## 2018-05-10 PROCEDURE — ? CHEMICAL PEEL

## 2018-05-10 PROCEDURE — ? CHEMICAL PEEL GLYCOLIC ACID

## 2018-05-10 PROCEDURE — ? DERMAPLANE

## 2018-05-10 ASSESSMENT — LOCATION DETAILED DESCRIPTION DERM
LOCATION DETAILED: LEFT INFERIOR MEDIAL MALAR CHEEK
LOCATION DETAILED: SUPERIOR THORACIC SPINE

## 2018-05-10 ASSESSMENT — LOCATION SIMPLE DESCRIPTION DERM
LOCATION SIMPLE: LEFT CHEEK
LOCATION SIMPLE: UPPER BACK

## 2018-05-10 ASSESSMENT — LOCATION ZONE DERM
LOCATION ZONE: FACE
LOCATION ZONE: TRUNK

## 2018-05-10 NOTE — PROCEDURE: CHEMICAL PEEL
Treatment Time (Optional): 4 min
Treatment Number: 0
Post-Care Instructions: I reviewed with the patient in detail post-care instructions. Patient should avoid sun exposure and wear sun protection.
Detail Level: Zone
Chemical Peel: MicroPeel Sensitive 2
Consent: Prior to the procedure, written consent was obtained and risks were reviewed, including but not limited to: redness, peeling, blistering, pigmentary change, scarring, infection, and pain.
Prep: The treated area was degreased with pre-peel cleanser, and vaseline was applied for protection of mucous membranes.
Post Peel Care: After the procedure, phyto correct was applied to the treated areas. Sun protection and post-care instructions were reviewed with the patient.

## 2018-05-10 NOTE — PROCEDURE: CHEMICAL PEEL GLYCOLIC ACID
Number Of Passes (Layers): 1
Prep: The treated area was degreased with pre-peel cleanser, and vaseline was applied for protection of mucous membranes.
Glycolic Acid %: 50%
Consent: Prior to the procedure, written consent was obtained and risks were reviewed, including but not limited to: redness, peeling, blistering, pigmentary change, scarring, infection, and pain.
Post Peel Care: The peel was neutralized with sodium bicarbonate. After the procedure, the treatment area was washed with soap and water, and a post-peel cream was applied. Sun protection and post-care instructions were reviewed with the patient.
Treatment Number: 0
Time (Mins): 3
Post-Care Instructions: I reviewed with the patient in detail post-care instructions. Patient should avoid sun exposure and wear sun protection.
Detail Level: Zone

## 2018-05-10 NOTE — PROCEDURE: DERMAPLANE
Treatment Area Prep: alcohol
Post-Care Instructions: I reviewed with the patient in detail post-care instructions.
Blade: [place] scalpel
Detail Level: Zone
Treatment Areas: face
Post-Procedure Instructions: Following the dermaplane procedure, Oxymist treatment was applied to the treatment areas. Moisturizer and SPF was applied.
Pre-Procedure Text: The patient was placed in a recumbant position on the procedure table.

## 2018-07-24 ENCOUNTER — HOSPITAL ENCOUNTER (OUTPATIENT)
Age: 68
Discharge: HOME | End: 2018-07-24
Payer: MEDICARE

## 2018-07-24 LAB
ANION GAP SERPL CALCULATED.3IONS-SCNC: 15 MMOL/L (ref 3–16)
BUN BLDV-MCNC: 16 MG/DL (ref 7–20)
CALCIUM SERPL-MCNC: 9.4 MG/DL (ref 8.3–10.6)
CHLORIDE BLD-SCNC: 101 MMOL/L (ref 99–110)
CO2: 24 MMOL/L (ref 21–32)
CREAT SERPL-MCNC: 0.6 MG/DL (ref 0.6–1.2)
GFR AFRICAN AMERICAN: >60
GFR NON-AFRICAN AMERICAN: >60
GLUCOSE BLD-MCNC: 67 MG/DL (ref 70–99)
POTASSIUM SERPL-SCNC: 3.9 MMOL/L (ref 3.5–5.1)
SODIUM BLD-SCNC: 140 MMOL/L (ref 136–145)

## 2018-07-24 PROCEDURE — 80048 BASIC METABOLIC PNL TOTAL CA: CPT

## 2018-07-24 PROCEDURE — 36415 COLL VENOUS BLD VENIPUNCTURE: CPT

## 2018-10-29 NOTE — PROCEDURE: CHEMICAL PEEL GLYCOLIC ACID
Time (Mins): 3
Consent: Prior to the procedure, written consent was obtained and risks were reviewed, including but not limited to: redness, peeling, blistering, pigmentary change, scarring, infection, and pain.
Number Of Passes (Layers): 1
Post-Care Instructions: I reviewed with the patient in detail post-care instructions. Patient should avoid sun exposure and wear sun protection.
Post Peel Care: The peel was neutralized with sodium bicarbonate. After the procedure, the treatment area was washed with soap and water, and a post-peel cream was applied. Sun protection and post-care instructions were reviewed with the patient.
Treatment Number: 0
Glycolic Acid %: 50%
Detail Level: Zone
Prep: The treated area was degreased with pre-peel cleanser, and vaseline was applied for protection of mucous membranes.
Principal Discharge DX:	Dermatitis

## 2018-11-01 ENCOUNTER — APPOINTMENT (RX ONLY)
Dept: URBAN - METROPOLITAN AREA CLINIC 126 | Facility: CLINIC | Age: 68
Setting detail: DERMATOLOGY
End: 2018-11-01

## 2018-11-01 DIAGNOSIS — Z41.9 ENCOUNTER FOR PROCEDURE FOR PURPOSES OTHER THAN REMEDYING HEALTH STATE, UNSPECIFIED: ICD-10-CM

## 2018-11-01 PROCEDURE — ? FACIAL

## 2018-11-01 PROCEDURE — ? CHEMICAL PEEL GLYCOLIC ACID

## 2018-11-01 ASSESSMENT — LOCATION ZONE DERM
LOCATION ZONE: FACE
LOCATION ZONE: TRUNK

## 2018-11-01 ASSESSMENT — LOCATION DETAILED DESCRIPTION DERM
LOCATION DETAILED: LEFT INFERIOR CENTRAL MALAR CHEEK
LOCATION DETAILED: LEFT SUPERIOR MEDIAL UPPER BACK

## 2018-11-01 ASSESSMENT — LOCATION SIMPLE DESCRIPTION DERM
LOCATION SIMPLE: LEFT CHEEK
LOCATION SIMPLE: LEFT UPPER BACK

## 2018-11-01 NOTE — PROCEDURE: FACIAL
Detail Level: Zone
Facial Steaming: steamed
Treatment Type (Optional): Spa Facial
Exfoliation Type: enzyme
Extraction Method: extractor

## 2018-11-01 NOTE — PROCEDURE: CHEMICAL PEEL GLYCOLIC ACID
Prep: The treated area was degreased with pre-peel cleanser, and vaseline was applied for protection of mucous membranes.
Treatment Number: 0
Post Peel Care: The peel was neutralized with sodium bicarbonate. After the procedure, the treatment area was washed with soap and water, and a post-peel cream was applied. Sun protection and post-care instructions were reviewed with the patient.
Post-Care Instructions: I reviewed with the patient in detail post-care instructions. Patient should avoid sun exposure and wear sun protection.
Consent: Prior to the procedure, written consent was obtained and risks were reviewed, including but not limited to: redness, peeling, blistering, pigmentary change, scarring, infection, and pain.
Time (Mins): 2
Glycolic Acid %: 50%
Number Of Passes (Layers): 1
Detail Level: Zone

## 2018-12-10 ENCOUNTER — APPOINTMENT (RX ONLY)
Dept: URBAN - METROPOLITAN AREA CLINIC 126 | Facility: CLINIC | Age: 68
Setting detail: DERMATOLOGY
End: 2018-12-10

## 2018-12-10 DIAGNOSIS — Z41.9 ENCOUNTER FOR PROCEDURE FOR PURPOSES OTHER THAN REMEDYING HEALTH STATE, UNSPECIFIED: ICD-10-CM

## 2018-12-10 PROCEDURE — ? DERMAPLANE

## 2018-12-10 PROCEDURE — ? CHEMICAL PEEL

## 2018-12-10 PROCEDURE — ? CHEMICAL PEEL GLYCOLIC ACID

## 2018-12-10 ASSESSMENT — LOCATION ZONE DERM
LOCATION ZONE: FACE
LOCATION ZONE: TRUNK

## 2018-12-10 ASSESSMENT — LOCATION DETAILED DESCRIPTION DERM
LOCATION DETAILED: LEFT INFERIOR CENTRAL MALAR CHEEK
LOCATION DETAILED: RIGHT SUPERIOR UPPER BACK

## 2018-12-10 ASSESSMENT — LOCATION SIMPLE DESCRIPTION DERM
LOCATION SIMPLE: RIGHT UPPER BACK
LOCATION SIMPLE: LEFT CHEEK

## 2018-12-10 NOTE — PROCEDURE: CHEMICAL PEEL GLYCOLIC ACID
Time (Mins): 4
Detail Level: Zone
Prep: The treated area was degreased with pre-peel cleanser, and vaseline was applied for protection of mucous membranes.
Post Peel Care: The peel was neutralized with sodium bicarbonate. After the procedure, the treatment area was washed with soap and water, and a post-peel cream was applied. Sun protection and post-care instructions were reviewed with the patient.
Consent: Prior to the procedure, written consent was obtained and risks were reviewed, including but not limited to: redness, peeling, blistering, pigmentary change, scarring, infection, and pain.
Glycolic Acid %: 50%
Post-Care Instructions: I reviewed with the patient in detail post-care instructions. Patient should avoid sun exposure and wear sun protection.
Number Of Passes (Layers): 1
Treatment Number: 0

## 2018-12-10 NOTE — PROCEDURE: CHEMICAL PEEL
Treatment Number: 0
Prep: The treated area was degreased with pre-peel cleanser, and vaseline was applied for protection of mucous membranes.
Treatment Time (Optional): 3 min
Post-Care Instructions: I reviewed with the patient in detail post-care instructions. Patient should avoid sun exposure and wear sun protection.
Number Of Layers: 3
Post Peel Care: After the procedure, phyto correct was applied to the treated areas. Sun protection and post-care instructions were reviewed with the patient.
Detail Level: Zone
Consent: Prior to the procedure, written consent was obtained and risks were reviewed, including but not limited to: redness, peeling, blistering, pigmentary change, scarring, infection, and pain.
Chemical Peel: MicroPeel Sensitive 2

## 2019-01-28 ENCOUNTER — APPOINTMENT (RX ONLY)
Dept: URBAN - METROPOLITAN AREA CLINIC 126 | Facility: CLINIC | Age: 69
Setting detail: DERMATOLOGY
End: 2019-01-28

## 2019-01-28 DIAGNOSIS — L82.1 OTHER SEBORRHEIC KERATOSIS: ICD-10-CM

## 2019-01-28 DIAGNOSIS — D18.0 HEMANGIOMA: ICD-10-CM

## 2019-01-28 DIAGNOSIS — L21.8 OTHER SEBORRHEIC DERMATITIS: ICD-10-CM

## 2019-01-28 DIAGNOSIS — L81.4 OTHER MELANIN HYPERPIGMENTATION: ICD-10-CM

## 2019-01-28 PROBLEM — D18.01 HEMANGIOMA OF SKIN AND SUBCUTANEOUS TISSUE: Status: ACTIVE | Noted: 2019-01-28

## 2019-01-28 PROBLEM — D23.71 OTHER BENIGN NEOPLASM OF SKIN OF RIGHT LOWER LIMB, INCLUDING HIP: Status: ACTIVE | Noted: 2019-01-28

## 2019-01-28 PROCEDURE — ? TREATMENT REGIMEN

## 2019-01-28 PROCEDURE — ? COUNSELING

## 2019-01-28 PROCEDURE — 99214 OFFICE O/P EST MOD 30 MIN: CPT

## 2019-01-28 ASSESSMENT — LOCATION ZONE DERM
LOCATION ZONE: TRUNK
LOCATION ZONE: ARM
LOCATION ZONE: EAR
LOCATION ZONE: LEG

## 2019-01-28 ASSESSMENT — LOCATION DETAILED DESCRIPTION DERM
LOCATION DETAILED: UPPER STERNUM
LOCATION DETAILED: SUPERIOR THORACIC SPINE
LOCATION DETAILED: LEFT MEDIAL SUPERIOR CHEST
LOCATION DETAILED: RIGHT PROXIMAL CALF
LOCATION DETAILED: RIGHT INFERIOR MEDIAL UPPER BACK
LOCATION DETAILED: LEFT PROXIMAL CALF
LOCATION DETAILED: RIGHT ANTERIOR PROXIMAL UPPER ARM
LOCATION DETAILED: EPIGASTRIC SKIN
LOCATION DETAILED: LEFT CAVUM CONCHA
LOCATION DETAILED: LEFT ANTERIOR PROXIMAL UPPER ARM

## 2019-01-28 ASSESSMENT — LOCATION SIMPLE DESCRIPTION DERM
LOCATION SIMPLE: LEFT CALF
LOCATION SIMPLE: LEFT EAR
LOCATION SIMPLE: RIGHT UPPER BACK
LOCATION SIMPLE: LEFT UPPER ARM
LOCATION SIMPLE: UPPER BACK
LOCATION SIMPLE: CHEST
LOCATION SIMPLE: RIGHT UPPER ARM
LOCATION SIMPLE: RIGHT CALF
LOCATION SIMPLE: ABDOMEN

## 2019-01-28 NOTE — PROCEDURE: TREATMENT REGIMEN
Samples Given: Synalar cream to use BID x 5-7 days then use prn with flares, she can call if she wants a RX for this
Detail Level: Simple

## 2019-03-04 ENCOUNTER — APPOINTMENT (RX ONLY)
Dept: URBAN - METROPOLITAN AREA CLINIC 126 | Facility: CLINIC | Age: 69
Setting detail: DERMATOLOGY
End: 2019-03-04

## 2019-03-04 DIAGNOSIS — Z41.9 ENCOUNTER FOR PROCEDURE FOR PURPOSES OTHER THAN REMEDYING HEALTH STATE, UNSPECIFIED: ICD-10-CM

## 2019-03-04 PROCEDURE — ? HYDRAFACIAL

## 2019-03-04 PROCEDURE — ? DERMAPLANE

## 2019-03-04 PROCEDURE — ? CHEMICAL PEEL GLYCOLIC ACID

## 2019-03-04 ASSESSMENT — LOCATION ZONE DERM
LOCATION ZONE: FACE
LOCATION ZONE: TRUNK

## 2019-03-04 ASSESSMENT — LOCATION DETAILED DESCRIPTION DERM
LOCATION DETAILED: SUPERIOR THORACIC SPINE
LOCATION DETAILED: LEFT INFERIOR CENTRAL MALAR CHEEK

## 2019-03-04 ASSESSMENT — LOCATION SIMPLE DESCRIPTION DERM
LOCATION SIMPLE: LEFT CHEEK
LOCATION SIMPLE: UPPER BACK

## 2019-03-04 NOTE — PROCEDURE: DERMAPLANE
Detail Level: Zone
Pre-Procedure Text: The patient was placed in a recumbant position on the procedure table.
Post-Procedure Instructions: Following the dermaplane procedure, Oxymist treatment was applied to the treatment areas. Moisturizer and SPF was applied.
Post-Care Instructions: I reviewed with the patient in detail post-care instructions.
Treatment Areas: face
Blade: [place] scalpel
Treatment Area Prep: alcohol

## 2019-03-04 NOTE — PROCEDURE: HYDRAFACIAL
Additional Vacuum Pressure (Won't Render If 0): 0
Post-Care Instructions: I reviewed with the patient in detail post-care instructions. Patient should stay away from the sun and wear sun protection until treated areas are fully healed.
Indication: skin texture
Detail Level: Zone
Consent: Written consent obtained, risks reviewed including but not limited to crusting, scabbing, blistering, scarring, darker or lighter pigmentary change, bruising, and/or incomplete response.
Treatment Number: 1
Vacuum Pressure: 10
Glycolic Acid %: 7.5%

## 2019-03-04 NOTE — PROCEDURE: CHEMICAL PEEL GLYCOLIC ACID
Treatment Number: 0
Post Peel Care: The peel was neutralized with sodium bicarbonate. After the procedure, the treatment area was washed with soap and water, and a post-peel cream was applied. Sun protection and post-care instructions were reviewed with the patient.
Post-Care Instructions: I reviewed with the patient in detail post-care instructions. Patient should avoid sun exposure and wear sun protection.
Glycolic Acid %: 70%
Time (Mins): 4
Consent: Prior to the procedure, written consent was obtained and risks were reviewed, including but not limited to: redness, peeling, blistering, pigmentary change, scarring, infection, and pain.
Detail Level: Zone
Number Of Passes (Layers): 1
Prep: The treated area was degreased with pre-peel cleanser, and vaseline was applied for protection of mucous membranes.

## 2019-04-04 ENCOUNTER — APPOINTMENT (RX ONLY)
Dept: URBAN - METROPOLITAN AREA CLINIC 126 | Facility: CLINIC | Age: 69
Setting detail: DERMATOLOGY
End: 2019-04-04

## 2019-04-04 DIAGNOSIS — Z41.9 ENCOUNTER FOR PROCEDURE FOR PURPOSES OTHER THAN REMEDYING HEALTH STATE, UNSPECIFIED: ICD-10-CM

## 2019-04-04 PROCEDURE — ? DERMAPLANE

## 2019-04-04 PROCEDURE — ? CHEMICAL PEEL GLYCOLIC ACID

## 2019-04-04 PROCEDURE — ? HYDRAFACIAL

## 2019-04-04 ASSESSMENT — LOCATION SIMPLE DESCRIPTION DERM
LOCATION SIMPLE: LEFT CHEEK
LOCATION SIMPLE: UPPER BACK

## 2019-04-04 ASSESSMENT — LOCATION ZONE DERM
LOCATION ZONE: TRUNK
LOCATION ZONE: FACE

## 2019-04-04 ASSESSMENT — LOCATION DETAILED DESCRIPTION DERM
LOCATION DETAILED: SUPERIOR THORACIC SPINE
LOCATION DETAILED: LEFT INFERIOR CENTRAL MALAR CHEEK

## 2019-04-04 NOTE — PROCEDURE: HYDRAFACIAL
Vacuum Pressure: 10
Number Of Passes: 0
Treatment Number: 1
Indication: skin texture
Detail Level: Zone
Post-Care Instructions: I reviewed with the patient in detail post-care instructions. Patient should stay away from the sun and wear sun protection until treated areas are fully healed.
Consent: Written consent obtained, risks reviewed including but not limited to crusting, scabbing, blistering, scarring, darker or lighter pigmentary change, bruising, and/or incomplete response.

## 2019-04-04 NOTE — PROCEDURE: CHEMICAL PEEL GLYCOLIC ACID
Treatment Number: 0
Post-Care Instructions: I reviewed with the patient in detail post-care instructions. Patient should avoid sun exposure and wear sun protection.
Glycolic Acid %: 30%
Detail Level: Zone
Prep: The treated area was degreased with pre-peel cleanser, and vaseline was applied for protection of mucous membranes.
Post Peel Care: The peel was neutralized with sodium bicarbonate. After the procedure, the treatment area was washed with soap and water, and a post-peel cream was applied. Sun protection and post-care instructions were reviewed with the patient.
Consent: Prior to the procedure, written consent was obtained and risks were reviewed, including but not limited to: redness, peeling, blistering, pigmentary change, scarring, infection, and pain.
Number Of Passes (Layers): 1
Time (Mins): 4

## 2019-04-04 NOTE — PROCEDURE: DERMAPLANE
Treatment Areas: face
Detail Level: Zone
Post-Care Instructions: I reviewed with the patient in detail post-care instructions.
Blade: [place] scalpel
Treatment Area Prep: alcohol
Pre-Procedure Text: The patient was placed in a recumbant position on the procedure table.
Post-Procedure Instructions: Following the dermaplane procedure, Oxymist treatment was applied to the treatment areas. Moisturizer and SPF was applied.

## 2019-05-01 ENCOUNTER — APPOINTMENT (RX ONLY)
Dept: URBAN - METROPOLITAN AREA CLINIC 126 | Facility: CLINIC | Age: 69
Setting detail: DERMATOLOGY
End: 2019-05-01

## 2019-05-01 DIAGNOSIS — Z41.9 ENCOUNTER FOR PROCEDURE FOR PURPOSES OTHER THAN REMEDYING HEALTH STATE, UNSPECIFIED: ICD-10-CM

## 2019-05-01 PROCEDURE — ? DERMAPLANE

## 2019-05-01 PROCEDURE — ? CHEMICAL PEEL GLYCOLIC ACID

## 2019-05-01 PROCEDURE — ? HYDRAFACIAL

## 2019-05-01 ASSESSMENT — LOCATION SIMPLE DESCRIPTION DERM
LOCATION SIMPLE: LEFT UPPER BACK
LOCATION SIMPLE: LEFT CHEEK

## 2019-05-01 ASSESSMENT — LOCATION ZONE DERM
LOCATION ZONE: TRUNK
LOCATION ZONE: FACE

## 2019-05-01 ASSESSMENT — LOCATION DETAILED DESCRIPTION DERM
LOCATION DETAILED: LEFT INFERIOR CENTRAL MALAR CHEEK
LOCATION DETAILED: LEFT CENTRAL MALAR CHEEK
LOCATION DETAILED: LEFT SUPERIOR MEDIAL UPPER BACK

## 2019-05-01 NOTE — PROCEDURE: HYDRAFACIAL
Treatment Number: 1
Post-Care Instructions: I reviewed with the patient in detail post-care instructions. Patient should stay away from the sun and wear sun protection until treated areas are fully healed.
Consent: Written consent obtained, risks reviewed including but not limited to crusting, scabbing, blistering, scarring, darker or lighter pigmentary change, bruising, and/or incomplete response.
Additional Vacuum Pressure (Won't Render If 0): 0
Indication: skin texture
Detail Level: Zone
Vacuum Pressure: 10

## 2019-05-01 NOTE — PROCEDURE: DERMAPLANE
Detail Level: Zone
Post-Care Instructions: I reviewed with the patient in detail post-care instructions.
Treatment Areas: face
Pre-Procedure Text: The patient was placed in a recumbant position on the procedure table.
Post-Procedure Instructions: Following the dermaplane procedure, Oxymist treatment was applied to the treatment areas. Moisturizer and SPF was applied.
Treatment Area Prep: alcohol
Blade: [place] scalpel

## 2019-11-20 ENCOUNTER — APPOINTMENT (RX ONLY)
Dept: URBAN - METROPOLITAN AREA CLINIC 126 | Facility: CLINIC | Age: 69
Setting detail: DERMATOLOGY
End: 2019-11-20

## 2019-11-20 DIAGNOSIS — Z41.9 ENCOUNTER FOR PROCEDURE FOR PURPOSES OTHER THAN REMEDYING HEALTH STATE, UNSPECIFIED: ICD-10-CM

## 2019-11-20 PROCEDURE — ? HYDRAFACIAL

## 2019-11-20 PROCEDURE — ? DERMAPLANE

## 2019-11-20 PROCEDURE — ? CHEMICAL PEEL GLYCOLIC ACID

## 2019-11-20 ASSESSMENT — LOCATION ZONE DERM
LOCATION ZONE: FACE
LOCATION ZONE: TRUNK

## 2019-11-20 ASSESSMENT — LOCATION DETAILED DESCRIPTION DERM
LOCATION DETAILED: LEFT INFERIOR CENTRAL MALAR CHEEK
LOCATION DETAILED: LEFT SUPERIOR CENTRAL BUCCAL CHEEK
LOCATION DETAILED: LEFT SUPERIOR MEDIAL UPPER BACK

## 2019-11-20 ASSESSMENT — LOCATION SIMPLE DESCRIPTION DERM
LOCATION SIMPLE: LEFT UPPER BACK
LOCATION SIMPLE: LEFT CHEEK

## 2019-11-20 NOTE — PROCEDURE: CHEMICAL PEEL GLYCOLIC ACID
Post-Care Instructions: I reviewed with the patient in detail post-care instructions. Patient should avoid sun exposure and wear sun protection.
Number Of Passes (Layers): 1
Prep: The treated area was degreased with pre-peel cleanser, and vaseline was applied for protection of mucous membranes.
Post Peel Care: The peel was neutralized with sodium bicarbonate. After the procedure, the treatment area was washed with soap and water, and a post-peel cream was applied. Sun protection and post-care instructions were reviewed with the patient.
Time (Mins): 4
Detail Level: Zone
Glycolic Acid %: 30%
Consent: Prior to the procedure, written consent was obtained and risks were reviewed, including but not limited to: redness, peeling, blistering, pigmentary change, scarring, infection, and pain.
Treatment Number: 0

## 2019-11-20 NOTE — PROCEDURE: DERMAPLANE
Detail Level: Zone
Pre-Procedure Text: The patient was placed in a recumbant position on the procedure table.
Treatment Areas: face
Blade: [place] scalpel
Treatment Area Prep: alcohol
Post-Procedure Instructions: Following the dermaplane procedure, Oxymist treatment was applied to the treatment areas. Moisturizer and SPF was applied.
Post-Care Instructions: I reviewed with the patient in detail post-care instructions.

## 2019-11-20 NOTE — PROCEDURE: HYDRAFACIAL
Treatment Number: 1
Vacuum Pressure: 12
Additional Vacuum Pressure (Won't Render If 0): 0
Post-Care Instructions: I reviewed with the patient in detail post-care instructions. Patient should stay away from the sun and wear sun protection until treated areas are fully healed.
Detail Level: Zone
Consent: Written consent obtained, risks reviewed including but not limited to crusting, scabbing, blistering, scarring, darker or lighter pigmentary change, bruising, and/or incomplete response.
Glycolic Acid %: 7.5%
Indication: skin texture

## 2019-12-03 ENCOUNTER — APPOINTMENT (RX ONLY)
Dept: URBAN - METROPOLITAN AREA CLINIC 126 | Facility: CLINIC | Age: 69
Setting detail: DERMATOLOGY
End: 2019-12-03

## 2019-12-03 DIAGNOSIS — Z41.9 ENCOUNTER FOR PROCEDURE FOR PURPOSES OTHER THAN REMEDYING HEALTH STATE, UNSPECIFIED: ICD-10-CM

## 2019-12-03 PROCEDURE — ? BOTOX

## 2019-12-03 PROCEDURE — ? FILLERS

## 2019-12-03 NOTE — PROCEDURE: BOTOX
Right Periorbital Units: 0
Expiration Date (Month Year): 4/2022
Show Forehead Units: Yes
Show Ucl Units: No
Consent: Written consent obtained. Risks include but not limited to lid/brow ptosis, bruising, swelling, diplopia, temporary effect, incomplete chemical denervation.
Additional Area 2 Location: crows feet
Additional Area 6 Location: upper forehead
Dilution (U/0.1 Cc): 1
Additional Area 5 Location: parentheses
Additional Area 1 Location: brow lift
Post-Care Instructions: Patient instructed to not lie down for 4 hours and limit physical activity for 24 hours. Patient instructed not to travel by airplane for 48 hours.
Additional Area 4 Location: upper lip lines
Glabellar Complex Units: [id number] Tyrone Jara
Lot #: [id number]
Additional Area 3 Location: bunnies
Detail Level: Simple

## 2019-12-03 NOTE — PROCEDURE: FILLERS
Additional Area 2 Location: lip volume
Lateral Face Filler  Volume In Cc: 0
Expiration Date (Month Year): 4/20/2020
Include Cannula Information In Note?: No
Additional Area 3 Location: lip borders
Post-Care Instructions: Patient instructed to apply ice to reduce swelling.
Consent: Written consent obtained. Risks include but not limited to bruising, beading, irregular texture, ulceration, infection, allergic reaction, scar formation, incomplete augmentation, temporary nature, procedural pain.
Additional Area 2 Location: upper lip lines
Filler: Juvederm Ultra XC
Lot #: [id number]
Additional Area 1 Location: lip lines
Additional Area 4 Location: corners
Expiration Date (Month Year): 7/31/2021
Additional Area 4 Location: chin lines
Nasolabial Folds Filler Volume In Cc: 0.5
Additional Area 5 Location: static lines
Additional Area 5 Location: lower face lines
Additional Area 3 Location: corners of mouth
Lot #: [id number]
Lot #: [id number]
Expiration Date (Month Year): 7/28/2020
Detail Level: Zone
Expiration Date (Month Year): 10/16/20
Use Map Statement For Sites (Optional): Yes
Map Statment: See [address] for Complete Details
Lot #: [id number]
Additional Area 1 Location: earlobes
Additional Area 1 Location: lip lines upper and lower

## 2020-01-02 ENCOUNTER — APPOINTMENT (RX ONLY)
Dept: URBAN - METROPOLITAN AREA CLINIC 126 | Facility: CLINIC | Age: 70
Setting detail: DERMATOLOGY
End: 2020-01-02

## 2020-01-02 DIAGNOSIS — Z41.9 ENCOUNTER FOR PROCEDURE FOR PURPOSES OTHER THAN REMEDYING HEALTH STATE, UNSPECIFIED: ICD-10-CM

## 2020-01-02 PROCEDURE — ? DERMAPLANE

## 2020-01-02 PROCEDURE — ? HYDRAFACIAL

## 2020-01-02 ASSESSMENT — LOCATION ZONE DERM: LOCATION ZONE: FACE

## 2020-01-02 ASSESSMENT — LOCATION SIMPLE DESCRIPTION DERM: LOCATION SIMPLE: LEFT CHEEK

## 2020-01-02 ASSESSMENT — LOCATION DETAILED DESCRIPTION DERM
LOCATION DETAILED: LEFT INFERIOR CENTRAL MALAR CHEEK
LOCATION DETAILED: LEFT INFERIOR MEDIAL MALAR CHEEK

## 2020-01-02 NOTE — PROCEDURE: DERMAPLANE
Post-Procedure Instructions: Following the dermaplane procedure, Oxymist treatment was applied to the treatment areas. Moisturizer and SPF was applied.
Treatment Areas: face
Pre-Procedure Text: The patient was placed in a recumbant position on the procedure table.
Treatment Area Prep: alcohol
Blade: [place] scalpel
Detail Level: Zone
Post-Care Instructions: I reviewed with the patient in detail post-care instructions.

## 2020-01-02 NOTE — PROCEDURE: HYDRAFACIAL
Additional Vacuum Pressure (Won't Render If 0): 0
Detail Level: Zone
Indication: skin texture
Glycolic Acid %: 7.5%
Consent: Written consent obtained, risks reviewed including but not limited to crusting, scabbing, blistering, scarring, darker or lighter pigmentary change, bruising, and/or incomplete response.
Vacuum Pressure: 12
Treatment Number: 1
Post-Care Instructions: I reviewed with the patient in detail post-care instructions. Patient should stay away from the sun and wear sun protection until treated areas are fully healed.

## 2020-01-13 ENCOUNTER — APPOINTMENT (RX ONLY)
Dept: URBAN - METROPOLITAN AREA CLINIC 126 | Facility: CLINIC | Age: 70
Setting detail: DERMATOLOGY
End: 2020-01-13

## 2020-01-13 DIAGNOSIS — Z41.9 ENCOUNTER FOR PROCEDURE FOR PURPOSES OTHER THAN REMEDYING HEALTH STATE, UNSPECIFIED: ICD-10-CM

## 2020-01-13 PROCEDURE — ? CHEMICAL PEEL GLYCOLIC ACID

## 2020-01-13 ASSESSMENT — LOCATION DETAILED DESCRIPTION DERM: LOCATION DETAILED: RIGHT SUPERIOR MEDIAL UPPER BACK

## 2020-01-13 ASSESSMENT — LOCATION SIMPLE DESCRIPTION DERM: LOCATION SIMPLE: RIGHT UPPER BACK

## 2020-01-13 ASSESSMENT — LOCATION ZONE DERM: LOCATION ZONE: TRUNK

## 2020-01-13 NOTE — PROCEDURE: CHEMICAL PEEL GLYCOLIC ACID
Detail Level: Zone
Consent: Prior to the procedure, written consent was obtained and risks were reviewed, including but not limited to: redness, peeling, blistering, pigmentary change, scarring, infection, and pain.
Treatment Number: 0
Glycolic Acid %: 30%
Post-Care Instructions: I reviewed with the patient in detail post-care instructions. Patient should avoid sun exposure and wear sun protection.
Number Of Passes (Layers): 2
Prep: The treated area was degreased with pre-peel cleanser, and vaseline was applied for protection of mucous membranes.
Post Peel Care: The peel was neutralized with sodium bicarbonate. After the procedure, the treatment area was washed with soap and water, and a post-peel cream was applied. Sun protection and post-care instructions were reviewed with the patient.
Time (Mins): 4

## 2020-02-03 ENCOUNTER — APPOINTMENT (RX ONLY)
Dept: URBAN - METROPOLITAN AREA CLINIC 126 | Facility: CLINIC | Age: 70
Setting detail: DERMATOLOGY
End: 2020-02-03

## 2020-02-03 DIAGNOSIS — L82.1 OTHER SEBORRHEIC KERATOSIS: ICD-10-CM

## 2020-02-03 DIAGNOSIS — D18.0 HEMANGIOMA: ICD-10-CM

## 2020-02-03 DIAGNOSIS — Z71.89 OTHER SPECIFIED COUNSELING: ICD-10-CM

## 2020-02-03 DIAGNOSIS — L81.4 OTHER MELANIN HYPERPIGMENTATION: ICD-10-CM

## 2020-02-03 PROBLEM — D23.71 OTHER BENIGN NEOPLASM OF SKIN OF RIGHT LOWER LIMB, INCLUDING HIP: Status: ACTIVE | Noted: 2020-02-03

## 2020-02-03 PROBLEM — D18.01 HEMANGIOMA OF SKIN AND SUBCUTANEOUS TISSUE: Status: ACTIVE | Noted: 2020-02-03

## 2020-02-03 PROCEDURE — 99214 OFFICE O/P EST MOD 30 MIN: CPT

## 2020-02-03 PROCEDURE — ? COUNSELING

## 2020-02-03 ASSESSMENT — LOCATION SIMPLE DESCRIPTION DERM
LOCATION SIMPLE: LEFT CALF
LOCATION SIMPLE: CHEST
LOCATION SIMPLE: RIGHT CALF
LOCATION SIMPLE: ABDOMEN
LOCATION SIMPLE: RIGHT UPPER BACK
LOCATION SIMPLE: LEFT BREAST
LOCATION SIMPLE: RIGHT FOREARM
LOCATION SIMPLE: LEFT FOREARM
LOCATION SIMPLE: UPPER BACK

## 2020-02-03 ASSESSMENT — LOCATION DETAILED DESCRIPTION DERM
LOCATION DETAILED: RIGHT DISTAL DORSAL FOREARM
LOCATION DETAILED: RIGHT PROXIMAL CALF
LOCATION DETAILED: EPIGASTRIC SKIN
LOCATION DETAILED: SUPERIOR THORACIC SPINE
LOCATION DETAILED: LEFT PROXIMAL CALF
LOCATION DETAILED: INFERIOR THORACIC SPINE
LOCATION DETAILED: LEFT MEDIAL BREAST 11-12:00 REGION
LOCATION DETAILED: LEFT PROXIMAL DORSAL FOREARM
LOCATION DETAILED: UPPER STERNUM
LOCATION DETAILED: RIGHT PROXIMAL DORSAL FOREARM
LOCATION DETAILED: RIGHT MID-UPPER BACK

## 2020-02-03 ASSESSMENT — LOCATION ZONE DERM
LOCATION ZONE: ARM
LOCATION ZONE: TRUNK
LOCATION ZONE: LEG

## 2020-02-03 NOTE — PROCEDURE: MIPS QUALITY
Detail Level: Detailed
Quality 111:Pneumonia Vaccination Status For Older Adults: Pneumococcal Vaccination Previously Received
Additional Notes: Patient unaware of medication and dosages. Will bring a medication list to next visit.
Quality 130: Documentation Of Current Medications In The Medical Record: Eligible clinician attests to documenting in the medical record the patient is not eligible for a current list of medications being obtained, updated, or reviewed by the eligible clinician

## 2020-02-06 ENCOUNTER — APPOINTMENT (RX ONLY)
Dept: URBAN - METROPOLITAN AREA CLINIC 126 | Facility: CLINIC | Age: 70
Setting detail: DERMATOLOGY
End: 2020-02-06

## 2020-02-06 DIAGNOSIS — Z41.9 ENCOUNTER FOR PROCEDURE FOR PURPOSES OTHER THAN REMEDYING HEALTH STATE, UNSPECIFIED: ICD-10-CM

## 2020-02-06 PROCEDURE — ? HYDRAFACIAL

## 2020-02-06 PROCEDURE — ? DERMAPLANE

## 2020-02-06 PROCEDURE — ? CHEMICAL PEEL GLYCOLIC ACID

## 2020-02-06 ASSESSMENT — LOCATION SIMPLE DESCRIPTION DERM
LOCATION SIMPLE: UPPER BACK
LOCATION SIMPLE: LEFT CHEEK

## 2020-02-06 ASSESSMENT — LOCATION DETAILED DESCRIPTION DERM
LOCATION DETAILED: LEFT INFERIOR CENTRAL MALAR CHEEK
LOCATION DETAILED: SUPERIOR THORACIC SPINE

## 2020-02-06 ASSESSMENT — LOCATION ZONE DERM
LOCATION ZONE: TRUNK
LOCATION ZONE: FACE

## 2020-02-06 NOTE — PROCEDURE: DERMAPLANE
Post-Care Instructions: I reviewed with the patient in detail post-care instructions.
Treatment Area Prep: alcohol
Pre-Procedure Text: The patient was placed in a recumbant position on the procedure table.
Detail Level: Zone
Post-Procedure Instructions: Following the dermaplane procedure, Oxymist treatment was applied to the treatment areas. Moisturizer and SPF was applied.
Treatment Areas: face
Blade: [place] scalpel

## 2020-02-06 NOTE — PROCEDURE: CHEMICAL PEEL GLYCOLIC ACID
Prep: The treated area was degreased with pre-peel cleanser, and vaseline was applied for protection of mucous membranes.
Post-Care Instructions: I reviewed with the patient in detail post-care instructions. Patient should avoid sun exposure and wear sun protection.
Post Peel Care: The peel was neutralized with sodium bicarbonate. After the procedure, the treatment area was washed with soap and water, and a post-peel cream was applied. Sun protection and post-care instructions were reviewed with the patient.
Consent: Prior to the procedure, written consent was obtained and risks were reviewed, including but not limited to: redness, peeling, blistering, pigmentary change, scarring, infection, and pain.
Glycolic Acid %: 30%
Number Of Passes (Layers): 2
Treatment Number: 0
Time (Mins): 4
Detail Level: Zone

## 2020-02-06 NOTE — PROCEDURE: HYDRAFACIAL
Vacuum Pressure: 12
Number Of Passes: 0
Treatment Number: 1
Detail Level: Zone
Post-Care Instructions: I reviewed with the patient in detail post-care instructions. Patient should stay away from the sun and wear sun protection until treated areas are fully healed.
Glycolic Acid %: 7.5%
Consent: Written consent obtained, risks reviewed including but not limited to crusting, scabbing, blistering, scarring, darker or lighter pigmentary change, bruising, and/or incomplete response.
Indication: skin texture

## 2020-03-02 ENCOUNTER — APPOINTMENT (RX ONLY)
Dept: URBAN - METROPOLITAN AREA CLINIC 126 | Facility: CLINIC | Age: 70
Setting detail: DERMATOLOGY
End: 2020-03-02

## 2020-03-02 DIAGNOSIS — Z41.9 ENCOUNTER FOR PROCEDURE FOR PURPOSES OTHER THAN REMEDYING HEALTH STATE, UNSPECIFIED: ICD-10-CM

## 2020-03-02 PROCEDURE — ? HYDRAFACIAL

## 2020-03-02 PROCEDURE — ? DERMAPLANE

## 2020-03-02 PROCEDURE — ? CHEMICAL PEEL GLYCOLIC ACID

## 2020-03-02 ASSESSMENT — LOCATION ZONE DERM
LOCATION ZONE: TRUNK
LOCATION ZONE: FACE

## 2020-03-02 ASSESSMENT — LOCATION DETAILED DESCRIPTION DERM
LOCATION DETAILED: SUPERIOR THORACIC SPINE
LOCATION DETAILED: RIGHT INFERIOR CENTRAL MALAR CHEEK
LOCATION DETAILED: LEFT INFERIOR MEDIAL MALAR CHEEK

## 2020-03-02 ASSESSMENT — LOCATION SIMPLE DESCRIPTION DERM
LOCATION SIMPLE: UPPER BACK
LOCATION SIMPLE: RIGHT CHEEK
LOCATION SIMPLE: LEFT CHEEK

## 2020-03-02 NOTE — PROCEDURE: DERMAPLANE
Pre-Procedure Text: The patient was placed in a recumbant position on the procedure table.
Post-Procedure Instructions: Following the dermaplane procedure, Oxymist treatment was applied to the treatment areas. Moisturizer and SPF was applied.
Treatment Area Prep: alcohol
Detail Level: Zone
Blade: [place] scalpel
Treatment Areas: face
Post-Care Instructions: I reviewed with the patient in detail post-care instructions.

## 2020-03-02 NOTE — PROCEDURE: CHEMICAL PEEL GLYCOLIC ACID
Prep: The treated area was degreased with pre-peel cleanser, and vaseline was applied for protection of mucous membranes.
Number Of Passes (Layers): 2
Consent: Prior to the procedure, written consent was obtained and risks were reviewed, including but not limited to: redness, peeling, blistering, pigmentary change, scarring, infection, and pain.
Detail Level: Zone
Post-Care Instructions: I reviewed with the patient in detail post-care instructions. Patient should avoid sun exposure and wear sun protection.
Post Peel Care: The peel was neutralized with sodium bicarbonate. After the procedure, the treatment area was washed with soap and water, and a post-peel cream was applied. Sun protection and post-care instructions were reviewed with the patient.
Time (Mins): 4
Treatment Number: 0
Glycolic Acid %: 70%

## 2020-03-02 NOTE — PROCEDURE: HYDRAFACIAL
Vacuum Pressure: 12
Treatment Number: 1
Post-Care Instructions: I reviewed with the patient in detail post-care instructions. Patient should stay away from the sun and wear sun protection until treated areas are fully healed.
Detail Level: Zone
Additional Vacuum Pressure (Won't Render If 0): 0
Consent: Written consent obtained, risks reviewed including but not limited to crusting, scabbing, blistering, scarring, darker or lighter pigmentary change, bruising, and/or incomplete response.
Glycolic Acid %: 7.5%
Indication: skin texture

## 2020-05-27 ENCOUNTER — APPOINTMENT (RX ONLY)
Dept: URBAN - METROPOLITAN AREA CLINIC 126 | Facility: CLINIC | Age: 70
Setting detail: DERMATOLOGY
End: 2020-05-27

## 2020-05-27 DIAGNOSIS — Z41.9 ENCOUNTER FOR PROCEDURE FOR PURPOSES OTHER THAN REMEDYING HEALTH STATE, UNSPECIFIED: ICD-10-CM

## 2020-05-27 PROCEDURE — ? DERMAPLANE

## 2020-05-27 PROCEDURE — ? CHEMICAL PEEL GLYCOLIC ACID

## 2020-05-27 PROCEDURE — ? HYDRAFACIAL

## 2020-05-27 ASSESSMENT — LOCATION SIMPLE DESCRIPTION DERM
LOCATION SIMPLE: LEFT CHEEK
LOCATION SIMPLE: LEFT UPPER BACK

## 2020-05-27 ASSESSMENT — LOCATION ZONE DERM
LOCATION ZONE: FACE
LOCATION ZONE: TRUNK

## 2020-05-27 ASSESSMENT — LOCATION DETAILED DESCRIPTION DERM
LOCATION DETAILED: LEFT INFERIOR CENTRAL MALAR CHEEK
LOCATION DETAILED: LEFT MEDIAL UPPER BACK

## 2020-05-27 NOTE — PROCEDURE: HYDRAFACIAL
Post-Care Instructions: I reviewed with the patient in detail post-care instructions. Patient should stay away from the sun and wear sun protection until treated areas are fully healed.
Number Of Passes: 0
Indication: skin texture
Consent: Written consent obtained, risks reviewed including but not limited to crusting, scabbing, blistering, scarring, darker or lighter pigmentary change, bruising, and/or incomplete response.
Detail Level: Zone
Glycolic Acid %: 7.5%
Vacuum Pressure: 12
Treatment Number: 1

## 2020-05-27 NOTE — PROCEDURE: CHEMICAL PEEL GLYCOLIC ACID
Number Of Passes (Layers): 2
Post-Care Instructions: I reviewed with the patient in detail post-care instructions. Patient should avoid sun exposure and wear sun protection.
Detail Level: Zone
Consent: Prior to the procedure, written consent was obtained and risks were reviewed, including but not limited to: redness, peeling, blistering, pigmentary change, scarring, infection, and pain.
Prep: The treated area was degreased with pre-peel cleanser, and vaseline was applied for protection of mucous membranes.
Time (Mins): 4
Glycolic Acid %: 70%
Treatment Number: 0
Post Peel Care: The peel was neutralized with sodium bicarbonate. After the procedure, the treatment area was washed with soap and water, and a post-peel cream was applied. Sun protection and post-care instructions were reviewed with the patient.

## 2020-05-27 NOTE — PROCEDURE: DERMAPLANE
Post-Care Instructions: I reviewed with the patient in detail post-care instructions.
Treatment Areas: face
Pre-Procedure Text: The patient was placed in a recumbant position on the procedure table.
Blade: [place] scalpel
Detail Level: Zone
Treatment Area Prep: alcohol
Post-Procedure Instructions: Following the dermaplane procedure, Oxymist treatment was applied to the treatment areas. Moisturizer and SPF was applied.

## 2020-11-05 ENCOUNTER — APPOINTMENT (RX ONLY)
Dept: URBAN - METROPOLITAN AREA CLINIC 126 | Facility: CLINIC | Age: 70
Setting detail: DERMATOLOGY
End: 2020-11-05

## 2020-11-05 DIAGNOSIS — Z41.9 ENCOUNTER FOR PROCEDURE FOR PURPOSES OTHER THAN REMEDYING HEALTH STATE, UNSPECIFIED: ICD-10-CM

## 2020-11-05 PROCEDURE — ? DERMAPLANE

## 2020-11-05 PROCEDURE — ? CHEMICAL PEEL

## 2020-11-05 PROCEDURE — ? HYDRAFACIAL

## 2020-11-05 ASSESSMENT — LOCATION DETAILED DESCRIPTION DERM
LOCATION DETAILED: LEFT CENTRAL MALAR CHEEK
LOCATION DETAILED: LEFT INFERIOR CENTRAL MALAR CHEEK
LOCATION DETAILED: SUPERIOR THORACIC SPINE

## 2020-11-05 ASSESSMENT — LOCATION SIMPLE DESCRIPTION DERM
LOCATION SIMPLE: UPPER BACK
LOCATION SIMPLE: LEFT CHEEK

## 2020-11-05 ASSESSMENT — LOCATION ZONE DERM
LOCATION ZONE: FACE
LOCATION ZONE: TRUNK

## 2020-11-05 NOTE — PROCEDURE: CHEMICAL PEEL
Number Of Layers: 3
Treatment Number: 0
Post Peel Care: Candi Sicard protection and post-care instructions were reviewed with the patient.
Post-Care Instructions: I reviewed with the patient in detail post-care instructions. Patient should avoid sun exposure and wear sun protection.
Treatment Time (Optional): 3 min
Consent: Prior to the procedure, written consent was obtained and risks were reviewed, including but not limited to: redness, peeling, blistering, pigmentary change, scarring, infection, and pain.
Chemical Peel: [place] 70%
Detail Level: Zone
Prep: The treated area was degreased with pre-peel cleanser, and vaseline was applied for protection of mucous membranes.

## 2020-11-05 NOTE — PROCEDURE: DERMAPLANE
Post-Procedure Instructions: Following the dermaplane procedure, Oxymist treatment was applied to the treatment areas. Moisturizer and SPF was applied.
Detail Level: Zone
Post-Care Instructions: I reviewed with the patient in detail post-care instructions.
Treatment Areas: face
Blade: [place] scalpel
Treatment Area Prep: alcohol
Pre-Procedure Text: The patient was placed in a recumbant position on the procedure table.

## 2020-11-05 NOTE — PROCEDURE: HYDRAFACIAL
Number Of Passes: 2
Treatment Number: 1
Post-Care Instructions: I reviewed with the patient in detail post-care instructions. Patient should stay away from the sun and wear sun protection until treated areas are fully healed.
Additional Vacuum Pressure (Won't Render If 0): 0
Indication: skin texture
Vacuum Pressure: [address] [facility]
Detail Level: Zone
Glycolic Acid %: 7.5%
Consent: Written consent obtained, risks reviewed including but not limited to crusting, scabbing, blistering, scarring, darker or lighter pigmentary change, bruising, and/or incomplete response.

## 2020-12-03 ENCOUNTER — APPOINTMENT (RX ONLY)
Dept: URBAN - METROPOLITAN AREA CLINIC 126 | Facility: CLINIC | Age: 70
Setting detail: DERMATOLOGY
End: 2020-12-03

## 2020-12-03 DIAGNOSIS — Z41.9 ENCOUNTER FOR PROCEDURE FOR PURPOSES OTHER THAN REMEDYING HEALTH STATE, UNSPECIFIED: ICD-10-CM

## 2020-12-03 PROCEDURE — ? HYDRAFACIAL

## 2020-12-03 PROCEDURE — ? CHEMICAL PEEL

## 2020-12-03 PROCEDURE — ? DERMAPLANE

## 2020-12-03 ASSESSMENT — LOCATION SIMPLE DESCRIPTION DERM
LOCATION SIMPLE: LEFT CHEEK
LOCATION SIMPLE: RIGHT UPPER BACK

## 2020-12-03 ASSESSMENT — LOCATION DETAILED DESCRIPTION DERM
LOCATION DETAILED: LEFT INFERIOR CENTRAL MALAR CHEEK
LOCATION DETAILED: RIGHT SUPERIOR UPPER BACK

## 2020-12-03 ASSESSMENT — LOCATION ZONE DERM
LOCATION ZONE: TRUNK
LOCATION ZONE: FACE

## 2020-12-03 NOTE — PROCEDURE: DERMAPLANE
Post-Care Instructions: I reviewed with the patient in detail post-care instructions.
Treatment Area Prep: alcohol
Pre-Procedure Text: The patient was placed in a recumbant position on the procedure table.
Post-Procedure Instructions: Following the dermaplane procedure, Oxymist treatment was applied to the treatment areas. Moisturizer and SPF was applied.
Detail Level: Zone
Treatment Areas: face
Blade: [place] scalpel

## 2020-12-03 NOTE — PROCEDURE: CHEMICAL PEEL
Chemical Peel: Glytone 70 Peel
Prep: The treated area was degreased with pre-peel cleanser, and vaseline was applied for protection of mucous membranes.
Consent: Prior to the procedure, written consent was obtained and risks were reviewed, including but not limited to: redness, peeling, blistering, pigmentary change, scarring, infection, and pain.
Treatment Time (Optional): 3 min
Post-Care Instructions: I reviewed with the patient in detail post-care instructions. Patient should avoid sun exposure and wear sun protection.
Post Peel Care: [facility] protection and post-care instructions were reviewed with the patient.
Treatment Number: 0
Number Of Layers: 3
Detail Level: Zone

## 2020-12-03 NOTE — PROCEDURE: HYDRAFACIAL
Glycolic Acid %: 7.5%
Indication: skin texture
Consent: Written consent obtained, risks reviewed including but not limited to crusting, scabbing, blistering, scarring, darker or lighter pigmentary change, bruising, and/or incomplete response.
Vacuum Pressure: [address] [facility]
Detail Level: Zone
Additional Vacuum Pressure (Won't Render If 0): 0
Post-Care Instructions: I reviewed with the patient in detail post-care instructions. Patient should stay away from the sun and wear sun protection until treated areas are fully healed.
Treatment Number: 1
Number Of Passes: 2

## 2021-01-07 ENCOUNTER — APPOINTMENT (RX ONLY)
Dept: URBAN - METROPOLITAN AREA CLINIC 126 | Facility: CLINIC | Age: 71
Setting detail: DERMATOLOGY
End: 2021-01-07

## 2021-01-07 DIAGNOSIS — Z41.9 ENCOUNTER FOR PROCEDURE FOR PURPOSES OTHER THAN REMEDYING HEALTH STATE, UNSPECIFIED: ICD-10-CM

## 2021-01-07 PROCEDURE — ? HYDRAFACIAL

## 2021-01-07 PROCEDURE — ? DERMAPLANE

## 2021-01-07 PROCEDURE — ? CHEMICAL PEEL GLYCOLIC ACID

## 2021-01-07 ASSESSMENT — LOCATION SIMPLE DESCRIPTION DERM
LOCATION SIMPLE: UPPER BACK
LOCATION SIMPLE: LEFT CHEEK

## 2021-01-07 ASSESSMENT — LOCATION ZONE DERM
LOCATION ZONE: FACE
LOCATION ZONE: TRUNK

## 2021-01-07 ASSESSMENT — LOCATION DETAILED DESCRIPTION DERM
LOCATION DETAILED: LEFT INFERIOR CENTRAL MALAR CHEEK
LOCATION DETAILED: SUPERIOR THORACIC SPINE

## 2021-01-07 NOTE — PROCEDURE: HYDRAFACIAL
Post-Care Instructions: I reviewed with the patient in detail post-care instructions. Patient should stay away from the sun and wear sun protection until treated areas are fully healed.
Vacuum Pressure: [address] [facility]
Glycolic Acid %: 7.5%
Treatment Number: 1
Number Of Passes: 2
Additional Vacuum Pressure (Won't Render If 0): 0
Detail Level: Zone
Indication: skin texture
Consent: Written consent obtained, risks reviewed including but not limited to crusting, scabbing, blistering, scarring, darker or lighter pigmentary change, bruising, and/or incomplete response.

## 2021-01-07 NOTE — PROCEDURE: DERMAPLANE
Pre-Procedure Text: The patient was placed in a recumbant position on the procedure table.
Detail Level: Zone
Post-Procedure Instructions: Following the dermaplane procedure, Oxymist treatment was applied to the treatment areas. Moisturizer and SPF was applied.
Post-Care Instructions: I reviewed with the patient in detail post-care instructions.
Blade: [place] scalpel
Treatment Areas: face
Treatment Area Prep: alcohol

## 2021-01-07 NOTE — PROCEDURE: CHEMICAL PEEL GLYCOLIC ACID
Post-Care Instructions: I reviewed with the patient in detail post-care instructions. Patient should avoid sun exposure and wear sun protection.
Time (Mins): 4
Detail Level: Zone
Post Peel Care: The peel was neutralized with sodium bicarbonate. After the procedure, the treatment area was washed with soap and water, and a post-peel cream was applied. Sun protection and post-care instructions were reviewed with the patient.
Treatment Number: 0
Prep: The treated area was degreased with pre-peel cleanser, and vaseline was applied for protection of mucous membranes.
Glycolic Acid %: 70%
Consent: Prior to the procedure, written consent was obtained and risks were reviewed, including but not limited to: redness, peeling, blistering, pigmentary change, scarring, infection, and pain.
Number Of Passes (Layers): 2

## 2021-02-04 ENCOUNTER — APPOINTMENT (RX ONLY)
Dept: URBAN - METROPOLITAN AREA CLINIC 126 | Facility: CLINIC | Age: 71
Setting detail: DERMATOLOGY
End: 2021-02-04

## 2021-02-04 DIAGNOSIS — Z41.9 ENCOUNTER FOR PROCEDURE FOR PURPOSES OTHER THAN REMEDYING HEALTH STATE, UNSPECIFIED: ICD-10-CM

## 2021-02-04 PROCEDURE — ? HYDRAFACIAL

## 2021-02-04 PROCEDURE — ? DERMAPLANE

## 2021-02-04 PROCEDURE — ? CHEMICAL PEEL GLYCOLIC ACID

## 2021-02-04 ASSESSMENT — LOCATION DETAILED DESCRIPTION DERM
LOCATION DETAILED: RIGHT SUPERIOR MEDIAL UPPER BACK
LOCATION DETAILED: LEFT INFERIOR CENTRAL MALAR CHEEK

## 2021-02-04 ASSESSMENT — LOCATION SIMPLE DESCRIPTION DERM
LOCATION SIMPLE: LEFT CHEEK
LOCATION SIMPLE: RIGHT UPPER BACK

## 2021-02-04 ASSESSMENT — LOCATION ZONE DERM
LOCATION ZONE: FACE
LOCATION ZONE: TRUNK

## 2021-02-04 NOTE — PROCEDURE: HYDRAFACIAL
Glycolic Acid %: 7.5%
Post-Care Instructions: I reviewed with the patient in detail post-care instructions. Patient should stay away from the sun and wear sun protection until treated areas are fully healed.
Vacuum Pressure: 9[address] [facility]
Detail Level: Zone
Additional Vacuum Pressure (Won't Render If 0): 0
Consent: Written consent obtained, risks reviewed including but not limited to crusting, scabbing, blistering, scarring, darker or lighter pigmentary change, bruising, and/or incomplete response.
Indication: skin texture
Treatment Number: 1
Number Of Passes: 2

## 2021-02-04 NOTE — PROCEDURE: CHEMICAL PEEL GLYCOLIC ACID
Glycolic Acid %: 70%
Treatment Number: 0
Prep: The treated area was degreased with pre-peel cleanser, and vaseline was applied for protection of mucous membranes.
Post-Care Instructions: I reviewed with the patient in detail post-care instructions. Patient should avoid sun exposure and wear sun protection.
Consent: Prior to the procedure, written consent was obtained and risks were reviewed, including but not limited to: redness, peeling, blistering, pigmentary change, scarring, infection, and pain.
Time (Mins): 4
Detail Level: Zone
Post Peel Care: The peel was neutralized with sodium bicarbonate. After the procedure, the treatment area was washed with soap and water, and a post-peel cream was applied. Sun protection and post-care instructions were reviewed with the patient.
Number Of Passes (Layers): 2

## 2021-02-04 NOTE — PROCEDURE: DERMAPLANE
Post-Procedure Instructions: Following the dermaplane procedure, Oxymist treatment was applied to the treatment areas. Moisturizer and SPF was applied.
Treatment Areas: face
Blade: [place] scalpel
Post-Care Instructions: I reviewed with the patient in detail post-care instructions.
Treatment Area Prep: alcohol
Detail Level: Zone
Pre-Procedure Text: The patient was placed in a recumbant position on the procedure table.

## 2021-02-08 ENCOUNTER — APPOINTMENT (RX ONLY)
Dept: URBAN - METROPOLITAN AREA CLINIC 126 | Facility: CLINIC | Age: 71
Setting detail: DERMATOLOGY
End: 2021-02-08

## 2021-02-08 ENCOUNTER — RX ONLY (OUTPATIENT)
Age: 71
Setting detail: RX ONLY
End: 2021-02-08

## 2021-02-08 DIAGNOSIS — Z71.89 OTHER SPECIFIED COUNSELING: ICD-10-CM

## 2021-02-08 DIAGNOSIS — D69.2 OTHER NONTHROMBOCYTOPENIC PURPURA: ICD-10-CM

## 2021-02-08 DIAGNOSIS — D49.2 NEOPLASM OF UNSPECIFIED BEHAVIOR OF BONE, SOFT TISSUE, AND SKIN: ICD-10-CM

## 2021-02-08 DIAGNOSIS — D18.0 HEMANGIOMA: ICD-10-CM

## 2021-02-08 DIAGNOSIS — L90.5 SCAR CONDITIONS AND FIBROSIS OF SKIN: ICD-10-CM

## 2021-02-08 DIAGNOSIS — L82.1 OTHER SEBORRHEIC KERATOSIS: ICD-10-CM

## 2021-02-08 DIAGNOSIS — L81.4 OTHER MELANIN HYPERPIGMENTATION: ICD-10-CM

## 2021-02-08 DIAGNOSIS — Z01.818 ENCOUNTER FOR OTHER PREPROCEDURAL EXAMINATION: ICD-10-CM

## 2021-02-08 PROBLEM — D23.72 OTHER BENIGN NEOPLASM OF SKIN OF LEFT LOWER LIMB, INCLUDING HIP: Status: ACTIVE | Noted: 2021-02-08

## 2021-02-08 PROBLEM — D18.01 HEMANGIOMA OF SKIN AND SUBCUTANEOUS TISSUE: Status: ACTIVE | Noted: 2021-02-08

## 2021-02-08 PROBLEM — D23.71 OTHER BENIGN NEOPLASM OF SKIN OF RIGHT LOWER LIMB, INCLUDING HIP: Status: ACTIVE | Noted: 2021-02-08

## 2021-02-08 PROCEDURE — ? DIAGNOSIS COMMENT

## 2021-02-08 PROCEDURE — ? COUNSELING

## 2021-02-08 PROCEDURE — 99213 OFFICE O/P EST LOW 20 MIN: CPT

## 2021-02-08 RX ORDER — CLINDAMYCIN HYDROCHLORIDE 300 MG/1
CAPSULE ORAL
Qty: 3 | Refills: 0 | Status: ERX | COMMUNITY
Start: 2021-02-08

## 2021-02-08 ASSESSMENT — LOCATION SIMPLE DESCRIPTION DERM
LOCATION SIMPLE: RIGHT LOWER BACK
LOCATION SIMPLE: RIGHT UPPER BACK
LOCATION SIMPLE: ABDOMEN
LOCATION SIMPLE: RIGHT POSTERIOR UPPER ARM
LOCATION SIMPLE: LEFT FOREARM
LOCATION SIMPLE: CHEST
LOCATION SIMPLE: RIGHT LIP

## 2021-02-08 ASSESSMENT — LOCATION DETAILED DESCRIPTION DERM
LOCATION DETAILED: RIGHT INFERIOR MEDIAL UPPER BACK
LOCATION DETAILED: EPIGASTRIC SKIN
LOCATION DETAILED: SUBXIPHOID
LOCATION DETAILED: RIGHT SUPERIOR MEDIAL UPPER BACK
LOCATION DETAILED: RIGHT INFERIOR VERMILION LIP
LOCATION DETAILED: LEFT PROXIMAL DORSAL FOREARM
LOCATION DETAILED: LEFT DISTAL DORSAL FOREARM
LOCATION DETAILED: RIGHT PROXIMAL POSTERIOR UPPER ARM
LOCATION DETAILED: RIGHT MID-UPPER BACK
LOCATION DETAILED: RIGHT INFERIOR MEDIAL MIDBACK
LOCATION DETAILED: UPPER STERNUM

## 2021-02-08 ASSESSMENT — LOCATION ZONE DERM
LOCATION ZONE: ARM
LOCATION ZONE: LIP
LOCATION ZONE: TRUNK

## 2021-02-08 NOTE — PROCEDURE: COUNSELING
Sunscreen Recommendations: Sunscreen with a minimum of 30 SPF, zinc or titanium such as Neutrogena or [employer].
Detail Level: Generalized
Detail Level: Simple
Detail Level: Detailed
Detail Level: Zone

## 2021-02-08 NOTE — PROCEDURE: DIAGNOSIS COMMENT
Render Risk Assessment In Note?: no
Detail Level: Zone
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
Comment: Attempted to drain with an 11 blade - 0.8cm Skin colored nodule - right lower lip - pt to schedule Excision with Dr. Stephanie Bence or Dr. Morrison Manifold

## 2021-02-17 ENCOUNTER — RX ONLY (OUTPATIENT)
Age: 71
Setting detail: RX ONLY
End: 2021-02-17

## 2021-02-17 RX ORDER — CLINDAMYCIN HYDROCHLORIDE 300 MG/1
CAPSULE ORAL
Qty: 3 | Refills: 0 | Status: ERX

## 2021-02-18 ENCOUNTER — APPOINTMENT (RX ONLY)
Dept: URBAN - METROPOLITAN AREA CLINIC 128 | Facility: CLINIC | Age: 71
Setting detail: DERMATOLOGY
End: 2021-02-18

## 2021-02-18 ENCOUNTER — RX ONLY (OUTPATIENT)
Age: 71
Setting detail: RX ONLY
End: 2021-02-18

## 2021-02-18 DIAGNOSIS — D37.01 NEOPLASM OF UNCERTAIN BEHAVIOR OF LIP: ICD-10-CM

## 2021-02-18 PROCEDURE — ? DEFER

## 2021-02-18 RX ORDER — CLINDAMYCIN HYDROCHLORIDE 300 MG/1
CAPSULE ORAL
Qty: 3 | Refills: 0 | Status: ERX

## 2021-02-18 NOTE — PROCEDURE: DEFER
Reason To Defer Override: Pt to visit ENT Thom Ramos
Instructions (Optional): Further evaluation
Detail Level: Detailed
Introduction Text (Please End With A Colon): Reinaldo Delaney

## 2021-02-18 NOTE — HPI: EVALUATION OF SKIN LESION(S)
What Type Of Note Output Would You Prefer (Optional)?: Standard Output
How Severe Are Your Spot(S)?: mild
Have Your Spot(S) Been Treated In The Past?: has not been treated
Hpi Title: Evaluation of a Skin Lesion
Additional History: Pt has bump lanced at previous visit with not draining or signs of a cyst. Pt states no symptoms on area

## 2021-03-15 ENCOUNTER — APPOINTMENT (RX ONLY)
Dept: URBAN - METROPOLITAN AREA CLINIC 126 | Facility: CLINIC | Age: 71
Setting detail: DERMATOLOGY
End: 2021-03-15

## 2021-03-15 DIAGNOSIS — Z41.9 ENCOUNTER FOR PROCEDURE FOR PURPOSES OTHER THAN REMEDYING HEALTH STATE, UNSPECIFIED: ICD-10-CM

## 2021-03-15 PROCEDURE — ? HYDRAFACIAL

## 2021-03-15 PROCEDURE — ? DERMAPLANE

## 2021-03-15 ASSESSMENT — LOCATION DETAILED DESCRIPTION DERM
LOCATION DETAILED: LEFT CENTRAL MALAR CHEEK
LOCATION DETAILED: LEFT INFERIOR CENTRAL MALAR CHEEK

## 2021-03-15 ASSESSMENT — LOCATION ZONE DERM: LOCATION ZONE: FACE

## 2021-03-15 ASSESSMENT — LOCATION SIMPLE DESCRIPTION DERM: LOCATION SIMPLE: LEFT CHEEK

## 2021-03-15 NOTE — PROCEDURE: DERMAPLANE
Detail Level: Zone
Blade: [place] scalpel
Post-Procedure Instructions: Following the dermaplane procedure, Oxymist treatment was applied to the treatment areas. Moisturizer and SPF was applied.
Post-Care Instructions: I reviewed with the patient in detail post-care instructions.
Treatment Area Prep: alcohol
Treatment Areas: face
Pre-Procedure Text: The patient was placed in a recumbant position on the procedure table.

## 2021-04-07 ENCOUNTER — APPOINTMENT (RX ONLY)
Dept: URBAN - METROPOLITAN AREA CLINIC 126 | Facility: CLINIC | Age: 71
Setting detail: DERMATOLOGY
End: 2021-04-07

## 2021-04-07 DIAGNOSIS — Z41.9 ENCOUNTER FOR PROCEDURE FOR PURPOSES OTHER THAN REMEDYING HEALTH STATE, UNSPECIFIED: ICD-10-CM

## 2021-04-07 PROCEDURE — ? DERMAPLANE

## 2021-04-07 PROCEDURE — ? HYDRAFACIAL

## 2021-04-07 ASSESSMENT — LOCATION DETAILED DESCRIPTION DERM
LOCATION DETAILED: LEFT CENTRAL MALAR CHEEK
LOCATION DETAILED: LEFT INFERIOR CENTRAL MALAR CHEEK
LOCATION DETAILED: LEFT SUPERIOR MEDIAL UPPER BACK

## 2021-04-07 ASSESSMENT — LOCATION ZONE DERM
LOCATION ZONE: TRUNK
LOCATION ZONE: FACE

## 2021-04-07 ASSESSMENT — LOCATION SIMPLE DESCRIPTION DERM
LOCATION SIMPLE: LEFT UPPER BACK
LOCATION SIMPLE: LEFT CHEEK

## 2021-04-07 NOTE — PROCEDURE: HYDRAFACIAL
Vacuum Pressure: 9[address] [facility]
Additional Vacuum Pressure (Won't Render If 0): 0
Number Of Passes: 2
Glycolic Acid %: 7.5%
Detail Level: Zone
Post-Care Instructions: I reviewed with the patient in detail post-care instructions. Patient should stay away from the sun and wear sun protection until treated areas are fully healed.
Indication: skin texture
Treatment Number: 1
Consent: Written consent obtained, risks reviewed including but not limited to crusting, scabbing, blistering, scarring, darker or lighter pigmentary change, bruising, and/or incomplete response.

## 2021-04-07 NOTE — PROCEDURE: DERMAPLANE
Pre-Procedure Text: The patient was placed in a recumbant position on the procedure table.
Post-Procedure Instructions: Following the dermaplane procedure, Oxymist treatment was applied to the treatment areas. Moisturizer and SPF was applied.
Detail Level: Zone
Post-Care Instructions: I reviewed with the patient in detail post-care instructions.
Treatment Areas: face
Treatment Area Prep: alcohol
Blade: [place] scalpel

## 2021-05-06 ENCOUNTER — APPOINTMENT (RX ONLY)
Dept: URBAN - METROPOLITAN AREA CLINIC 126 | Facility: CLINIC | Age: 71
Setting detail: DERMATOLOGY
End: 2021-05-06

## 2021-05-06 DIAGNOSIS — Z41.9 ENCOUNTER FOR PROCEDURE FOR PURPOSES OTHER THAN REMEDYING HEALTH STATE, UNSPECIFIED: ICD-10-CM

## 2021-05-06 PROCEDURE — ? DERMAPLANE

## 2021-05-06 PROCEDURE — ? HYDRAFACIAL

## 2021-05-06 ASSESSMENT — LOCATION DETAILED DESCRIPTION DERM
LOCATION DETAILED: LEFT SUPERIOR MEDIAL UPPER BACK
LOCATION DETAILED: LEFT INFERIOR MEDIAL MALAR CHEEK
LOCATION DETAILED: LEFT INFERIOR CENTRAL MALAR CHEEK

## 2021-05-06 ASSESSMENT — LOCATION ZONE DERM
LOCATION ZONE: FACE
LOCATION ZONE: TRUNK

## 2021-05-06 ASSESSMENT — LOCATION SIMPLE DESCRIPTION DERM
LOCATION SIMPLE: LEFT UPPER BACK
LOCATION SIMPLE: LEFT CHEEK

## 2021-05-06 NOTE — PROCEDURE: HYDRAFACIAL
Detail Level: Zone
Number Of Passes: 2
Indication: skin texture
Additional Vacuum Pressure (Won't Render If 0): 0
Consent: Written consent obtained, risks reviewed including but not limited to crusting, scabbing, blistering, scarring, darker or lighter pigmentary change, bruising, and/or incomplete response.
Glycolic Acid %: 7.5%
Vacuum Pressure: 9[address] [facility]
Post-Care Instructions: I reviewed with the patient in detail post-care instructions. Patient should stay away from the sun and wear sun protection until treated areas are fully healed.
Treatment Number: 1

## 2021-05-06 NOTE — PROCEDURE: DERMAPLANE
Treatment Areas: face
Post-Procedure Instructions: Following the dermaplane procedure, Oxymist treatment was applied to the treatment areas. Moisturizer and SPF was applied.
Detail Level: Zone
Treatment Area Prep: alcohol
Pre-Procedure Text: The patient was placed in a recumbant position on the procedure table.
Blade: [place] scalpel
Post-Care Instructions: I reviewed with the patient in detail post-care instructions.

## 2021-07-20 ENCOUNTER — HOSPITAL ENCOUNTER (OUTPATIENT)
Dept: MRI IMAGING | Age: 71
Discharge: HOME | End: 2021-07-20
Payer: MEDICARE

## 2021-07-20 DIAGNOSIS — G95.19 NEUROGENIC CLAUDICATION (HCC): ICD-10-CM

## 2021-07-20 DIAGNOSIS — M48.00 SPINAL STENOSIS, UNSPECIFIED SPINAL REGION: ICD-10-CM

## 2021-07-20 PROCEDURE — 72148 MRI LUMBAR SPINE W/O DYE: CPT

## 2021-11-04 ENCOUNTER — APPOINTMENT (RX ONLY)
Dept: URBAN - METROPOLITAN AREA CLINIC 126 | Facility: CLINIC | Age: 71
Setting detail: DERMATOLOGY
End: 2021-11-04

## 2021-11-04 DIAGNOSIS — Z41.9 ENCOUNTER FOR PROCEDURE FOR PURPOSES OTHER THAN REMEDYING HEALTH STATE, UNSPECIFIED: ICD-10-CM

## 2021-11-04 PROCEDURE — ? CHEMICAL PEEL

## 2021-11-04 PROCEDURE — ? DERMAPLANE

## 2021-11-04 PROCEDURE — ? HYDRAFACIAL

## 2021-11-04 ASSESSMENT — LOCATION SIMPLE DESCRIPTION DERM
LOCATION SIMPLE: LEFT CHEEK
LOCATION SIMPLE: RIGHT UPPER BACK

## 2021-11-04 ASSESSMENT — LOCATION DETAILED DESCRIPTION DERM
LOCATION DETAILED: LEFT INFERIOR CENTRAL MALAR CHEEK
LOCATION DETAILED: RIGHT SUPERIOR MEDIAL UPPER BACK
LOCATION DETAILED: LEFT CENTRAL MALAR CHEEK

## 2021-11-04 ASSESSMENT — LOCATION ZONE DERM
LOCATION ZONE: TRUNK
LOCATION ZONE: FACE

## 2021-11-04 NOTE — PROCEDURE: HYDRAFACIAL
Additional Vacuum Pressure (Won't Render If 0): 0
Indication: skin texture
Consent: Written consent obtained, risks reviewed including but not limited to crusting, scabbing, blistering, scarring, darker or lighter pigmentary change, bruising, and/or incomplete response.
Treatment Number: 1
Vacuum Pressure: 9[address] [facility]
Post-Care Instructions: I reviewed with the patient in detail post-care instructions. Patient should stay away from the sun and wear sun protection until treated areas are fully healed.
Glycolic Acid %: 7.5%
Detail Level: Zone
Number Of Passes: 2

## 2021-11-04 NOTE — PROCEDURE: DERMAPLANE
Post-Procedure Instructions: Following the dermaplane procedure, Oxymist treatment was applied to the treatment areas. Moisturizer and SPF was applied.
Blade: [place] scalpel
Treatment Areas: face
Pre-Procedure Text: The patient was placed in a recumbant position on the procedure table.
Post-Care Instructions: I reviewed with the patient in detail post-care instructions.
Treatment Area Prep: alcohol
Detail Level: Zone

## 2021-12-27 NOTE — PROCEDURE: HYDRAFACIAL
Vacuum Pressure: [address] [facility]
Additional Vacuum Pressure (Won't Render If 0): 0
Glycolic Acid %: 7.5%
Detail Level: Zone
Treatment Number: 1
Indication: skin texture
Post-Care Instructions: I reviewed with the patient in detail post-care instructions. Patient should stay away from the sun and wear sun protection until treated areas are fully healed.
Consent: Written consent obtained, risks reviewed including but not limited to crusting, scabbing, blistering, scarring, darker or lighter pigmentary change, bruising, and/or incomplete response.
None known in lifetime
Number Of Passes: 2

## 2022-01-06 ENCOUNTER — APPOINTMENT (RX ONLY)
Dept: URBAN - METROPOLITAN AREA CLINIC 126 | Facility: CLINIC | Age: 72
Setting detail: DERMATOLOGY
End: 2022-01-06

## 2022-01-06 DIAGNOSIS — Z41.9 ENCOUNTER FOR PROCEDURE FOR PURPOSES OTHER THAN REMEDYING HEALTH STATE, UNSPECIFIED: ICD-10-CM

## 2022-01-06 PROCEDURE — ? DERMAPLANE

## 2022-01-06 PROCEDURE — ? HYDRAFACIAL

## 2022-01-06 ASSESSMENT — LOCATION ZONE DERM: LOCATION ZONE: FACE

## 2022-01-06 ASSESSMENT — LOCATION DETAILED DESCRIPTION DERM: LOCATION DETAILED: LEFT INFERIOR CENTRAL MALAR CHEEK

## 2022-01-06 ASSESSMENT — LOCATION SIMPLE DESCRIPTION DERM: LOCATION SIMPLE: LEFT CHEEK

## 2022-01-06 NOTE — PROCEDURE: HYDRAFACIAL
Tip: Hydropeel Tip, Teal
Number Of Passes: 0
Solution: Activ-4
Tip Override
Solution Override
Procedure: Peel
Procedure: Extend and Protect
Tip: Hydropeel Tip, Clear
Consent: Written consent obtained, risks reviewed including but not limited to crusting, scabbing, blistering, scarring, darker or lighter pigmentary change, bruising, and/or incomplete response.
Procedure: Boost
Post-Care Instructions: I reviewed with the patient in detail post-care instructions. Patient should stay away from the sun and wear sun protection until treated areas are fully healed.
Solution: GlySal 7.5%
Solution: Beta-HD
Procedure: Extraction
Treatment Number: 1
Indication: skin texture
Procedure: Fusion
Tip: Hydropeel Tip, Blue
Procedure: Exfoliation
Location: face

## 2022-01-06 NOTE — PROCEDURE: DERMAPLANE
Pre-Procedure Text: The patient was placed in a recumbant position on the procedure table.
Treatment Areas: face
Post-Care Instructions: I reviewed with the patient in detail post-care instructions.
Post-Procedure Instructions: Following the dermaplane procedure, Oxymist treatment was applied to the treatment areas. Moisturizer and SPF was applied.
Blade: [place] scalpel
Treatment Area Prep: alcohol
Detail Level: Zone

## 2022-02-07 ENCOUNTER — APPOINTMENT (RX ONLY)
Dept: URBAN - METROPOLITAN AREA CLINIC 126 | Facility: CLINIC | Age: 72
Setting detail: DERMATOLOGY
End: 2022-02-07

## 2022-02-07 DIAGNOSIS — Z71.89 OTHER SPECIFIED COUNSELING: ICD-10-CM

## 2022-02-07 DIAGNOSIS — D18.0 HEMANGIOMA: ICD-10-CM

## 2022-02-07 DIAGNOSIS — L82.1 OTHER SEBORRHEIC KERATOSIS: ICD-10-CM

## 2022-02-07 DIAGNOSIS — L81.4 OTHER MELANIN HYPERPIGMENTATION: ICD-10-CM

## 2022-02-07 DIAGNOSIS — L90.5 SCAR CONDITIONS AND FIBROSIS OF SKIN: ICD-10-CM

## 2022-02-07 DIAGNOSIS — T300 BLISTERS, EPIDERMAL LOSS [SECOND DEGREE], UNSPECIFIED SITE: ICD-10-CM | Status: WORSENING

## 2022-02-07 DIAGNOSIS — L82.0 INFLAMED SEBORRHEIC KERATOSIS: ICD-10-CM

## 2022-02-07 PROBLEM — D23.71 OTHER BENIGN NEOPLASM OF SKIN OF RIGHT LOWER LIMB, INCLUDING HIP: Status: ACTIVE | Noted: 2022-02-07

## 2022-02-07 PROBLEM — T20.26XA BURN OF SECOND DEGREE OF FOREHEAD AND CHEEK, INITIAL ENCOUNTER: Status: ACTIVE | Noted: 2022-02-07

## 2022-02-07 PROBLEM — D18.01 HEMANGIOMA OF SKIN AND SUBCUTANEOUS TISSUE: Status: ACTIVE | Noted: 2022-02-07

## 2022-02-07 PROCEDURE — ? DIAGNOSIS COMMENT

## 2022-02-07 PROCEDURE — ? LIQUID NITROGEN

## 2022-02-07 PROCEDURE — ? PRESCRIPTION MEDICATION MANAGEMENT

## 2022-02-07 PROCEDURE — ? SUNSCREEN RECOMMENDATIONS

## 2022-02-07 PROCEDURE — 99214 OFFICE O/P EST MOD 30 MIN: CPT | Mod: 25

## 2022-02-07 PROCEDURE — 17110 DESTRUCTION B9 LES UP TO 14: CPT

## 2022-02-07 PROCEDURE — ? COUNSELING

## 2022-02-07 PROCEDURE — ? PRESCRIPTION

## 2022-02-07 RX ORDER — MUPIROCIN 20 MG/G
1 OINTMENT TOPICAL BID
Qty: 22 | Refills: 2 | Status: ERX | COMMUNITY
Start: 2022-02-07

## 2022-02-07 RX ADMIN — MUPIROCIN 1: 20 OINTMENT TOPICAL at 00:00

## 2022-02-07 ASSESSMENT — LOCATION DETAILED DESCRIPTION DERM
LOCATION DETAILED: RIGHT SUPERIOR LATERAL LOWER BACK
LOCATION DETAILED: LEFT INFERIOR UPPER BACK
LOCATION DETAILED: RIGHT SUPERIOR MEDIAL UPPER BACK
LOCATION DETAILED: RIGHT RIB CAGE
LOCATION DETAILED: EPIGASTRIC SKIN
LOCATION DETAILED: LEFT LATERAL ABDOMEN
LOCATION DETAILED: LEFT INFERIOR LATERAL MIDBACK
LOCATION DETAILED: LEFT SUPERIOR UPPER BACK
LOCATION DETAILED: RIGHT POSTERIOR SHOULDER
LOCATION DETAILED: RIGHT INFERIOR UPPER BACK
LOCATION DETAILED: RIGHT LATERAL UPPER BACK
LOCATION DETAILED: LEFT SUPERIOR LATERAL LOWER BACK
LOCATION DETAILED: LEFT ANTERIOR SHOULDER
LOCATION DETAILED: RIGHT ANTERIOR SHOULDER
LOCATION DETAILED: RIGHT MEDIAL FOREHEAD
LOCATION DETAILED: RIGHT INFERIOR MEDIAL MIDBACK
LOCATION DETAILED: LEFT POSTERIOR SHOULDER
LOCATION DETAILED: LEFT MID-UPPER BACK
LOCATION DETAILED: RIGHT LATERAL ABDOMEN
LOCATION DETAILED: LEFT SUPERIOR MEDIAL MIDBACK

## 2022-02-07 ASSESSMENT — LOCATION SIMPLE DESCRIPTION DERM
LOCATION SIMPLE: LEFT UPPER BACK
LOCATION SIMPLE: RIGHT UPPER BACK
LOCATION SIMPLE: LEFT LOWER BACK
LOCATION SIMPLE: ABDOMEN
LOCATION SIMPLE: RIGHT FOREHEAD
LOCATION SIMPLE: LEFT SHOULDER
LOCATION SIMPLE: RIGHT SHOULDER
LOCATION SIMPLE: RIGHT LOWER BACK

## 2022-02-07 ASSESSMENT — LOCATION ZONE DERM
LOCATION ZONE: ARM
LOCATION ZONE: TRUNK
LOCATION ZONE: FACE

## 2022-02-07 NOTE — PROCEDURE: COUNSELING
Detail Level: Simple
Detail Level: Detailed
Detail Level: Zone
Sunscreen Recommendations: Sunscreen with a minimum of 30 SPF, zinc or titanium such as Neutrogena or [employer].
Detail Level: Generalized

## 2022-02-07 NOTE — PROCEDURE: PRESCRIPTION MEDICATION MANAGEMENT
Detail Level: Zone
Render In Strict Bullet Format?: No
Initiate Treatment: Mupirocin 2% ointment BID to upper forehead.

## 2022-02-07 NOTE — PROCEDURE: LIQUID NITROGEN
Include Z78.9 (Other Specified Conditions Influencing Health Status) As An Associated Diagnosis?: No
Medical Necessity Clause: This procedure was medically necessary because the lesions that were treated were:
Post-Care Instructions: I reviewed with the patient in detail post-care instructions. Patient is to wear sunprotection, and avoid picking at any of the treated lesions. Pt may apply Vaseline to crusted or scabbing areas.
Spray Paint Text: The liquid nitrogen was applied to the skin utilizing a spray paint frosting technique.
Show Aperture Variable?: Yes
Detail Level: Simple
Number Of Freeze-Thaw Cycles: 3 freeze-thaw cycles
Consent: The patient's consent was obtained including but not limited to risks of crusting, scabbing, blistering, scarring, darker or lighter pigmentary change, recurrence, incomplete removal and infection.
Medical Necessity Information: It is in your best interest to select a reason for this procedure from the list below. All of these items fulfill various CMS LCD requirements except the new and changing color options.
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
## 2022-02-14 ENCOUNTER — APPOINTMENT (RX ONLY)
Dept: URBAN - METROPOLITAN AREA CLINIC 126 | Facility: CLINIC | Age: 72
Setting detail: DERMATOLOGY
End: 2022-02-14

## 2022-02-14 DIAGNOSIS — Z41.9 ENCOUNTER FOR PROCEDURE FOR PURPOSES OTHER THAN REMEDYING HEALTH STATE, UNSPECIFIED: ICD-10-CM

## 2022-02-14 PROCEDURE — ? DERMAPLANE

## 2022-02-14 PROCEDURE — ? CHEMICAL PEEL

## 2022-02-14 PROCEDURE — ? HYDRAFACIAL

## 2022-02-14 ASSESSMENT — LOCATION SIMPLE DESCRIPTION DERM: LOCATION SIMPLE: LEFT CHEEK

## 2022-02-14 ASSESSMENT — LOCATION ZONE DERM: LOCATION ZONE: FACE

## 2022-02-14 NOTE — PROCEDURE: DERMAPLANE
Post-Procedure Instructions: Following the dermaplane procedure, Oxymist treatment was applied to the treatment areas. Moisturizer and SPF was applied.
Pre-Procedure Text: The patient was placed in a recumbant position on the procedure table.
Treatment Areas: face
Detail Level: Zone
Post-Care Instructions: I reviewed with the patient in detail post-care instructions.
Blade: [place] scalpel
Treatment Area Prep: alcohol

## 2022-02-14 NOTE — PROCEDURE: HYDRAFACIAL
Tip: Hydropeel Tip, Teal
Vacuum Pressure High Setting (Will Not Render If Set To 0): 0
Procedure: Extend and Protect
Procedure: Exfoliation
Solution Override
Location: face
Procedure: Peel
Solution: Activ-4
Tip: Hydropeel Tip, Clear
Procedure: Extraction
Solution: GlySal 7.5%
Consent: Written consent obtained, risks reviewed including but not limited to crusting, scabbing, blistering, scarring, darker or lighter pigmentary change, bruising, and/or incomplete response.
Solution: Beta-HD
Treatment Number: 1
Procedure: Boost
Procedure: Fusion
Tip: Hydropeel Tip, Blue
Tip Override
Post-Care Instructions: I reviewed with the patient in detail post-care instructions. Patient should stay away from the sun and wear sun protection until treated areas are fully healed.
Indication: skin texture

## 2022-02-14 NOTE — PROCEDURE: CHEMICAL PEEL
The Lower Extremity Functional Scale    Patient: Seamus Butt  : 1951  MRN: [id number]  Date: 2018  Electronically Signed by: Americo Chong, [place] [id number]     We are interested in knowing whether you are having any difficulty at all with the activities listed below because of your lower limb problem for which you are currently seeking attention. Please provide an answer for each activity.          Today would you have difficulty at all with:    Activities Extreme Difficulty or Unable to Perform Activity Quite a Bit of Difficulty Moderate Difficulty A Little Bit of Difficulty No Difficulty   1 Any of your usual work, housework, or school activities []0 []1 [x]2  []3 []4   2 Your usual hobbies, recreational, or sporting activities [x]0 []1 []2 []3 []4   3 Getting into or out of the bath []0 []1 [x]2 []3 []4   4 Walking between rooms []0 []1 []2 []3 [x]4   5 Putting on your shoes or socks []0 []1 [x]2 []3 []4   6 Squatting []0 []1 [x]2 []3 []4   7 Lifting an object, like a bag of groceries from the floor []0 [x]1 []2 []3 []4   8 Performing light activities around your home []0 []1 [x]2 []3 []4   9 Performing heavy activities around your home [x]0 []1 []2 []3 []4   10 Getting into or out of a car []0 []1 [x]2 []3 []4   11 Walking 2 blocks [x]0 []1 []2 []3 []4   12 Walking a mile [x]0 []1 []2 []3 []4   13 Going up or down 10 stairs (about 1 flight of stairs) []0 [x]1 []2 []3 []4   14 Standing for 1 hour [x]0 []1 []2 []3 []4   15 Sitting for 1 hour []0 []1 []2 [x]3 []4   16 Running on even ground  [x]0 []1 []2 []3 []4   17 Running on uneven ground  [x]0 []1 []2 []3 []4   18 Making sharp turns while running fast  [x]0 []1 []2 []3 []4   19 Hopping []0 []1 [x]2 []3 []4   20 Rolling over in bed [x]0 []1 []2 []3 []4    Column Totals: 0 2 14 3 4       Patient Score from Above: 23/80    (Patient Score / 80) X 100:  28.8% confidence, 71.2% disability    Scoring Method for Lower Extremity Functional Scale    The
Treatment Time (Optional): 3 min
Post Peel Care: Sharron Hein protection and post-care instructions were reviewed with the patient.
Treatment Number: 0
Number Of Layers: 3
Detail Level: Zone
Post-Care Instructions: I reviewed with the patient in detail post-care instructions. Patient should avoid sun exposure and wear sun protection.
Chemical Peel: Micropeel 30 Solution
Consent: Prior to the procedure, written consent was obtained and risks were reviewed, including but not limited to: redness, peeling, blistering, pigmentary change, scarring, infection, and pain.
Prep: The treated area was degreased with pre-peel cleanser, and vaseline was applied for protection of mucous membranes.

## 2022-03-10 ENCOUNTER — APPOINTMENT (RX ONLY)
Dept: URBAN - METROPOLITAN AREA CLINIC 126 | Facility: CLINIC | Age: 72
Setting detail: DERMATOLOGY
End: 2022-03-10

## 2022-03-10 DIAGNOSIS — Z41.9 ENCOUNTER FOR PROCEDURE FOR PURPOSES OTHER THAN REMEDYING HEALTH STATE, UNSPECIFIED: ICD-10-CM

## 2022-03-10 PROCEDURE — ? CHEMICAL PEEL

## 2022-03-10 PROCEDURE — ? HYDRAFACIAL

## 2022-03-10 PROCEDURE — ? DERMAPLANE

## 2022-03-10 ASSESSMENT — LOCATION DETAILED DESCRIPTION DERM
LOCATION DETAILED: SUPERIOR THORACIC SPINE
LOCATION DETAILED: LEFT INFERIOR CENTRAL MALAR CHEEK
LOCATION DETAILED: LEFT SUPERIOR CENTRAL BUCCAL CHEEK

## 2022-03-10 ASSESSMENT — LOCATION ZONE DERM
LOCATION ZONE: FACE
LOCATION ZONE: TRUNK

## 2022-03-10 ASSESSMENT — LOCATION SIMPLE DESCRIPTION DERM
LOCATION SIMPLE: UPPER BACK
LOCATION SIMPLE: LEFT CHEEK

## 2022-03-10 NOTE — PROCEDURE: DERMAPLANE
Post-Procedure Instructions: Following the dermaplane procedure, Oxymist treatment was applied to the treatment areas. Moisturizer and SPF was applied.
Treatment Area Prep: alcohol
Post-Care Instructions: I reviewed with the patient in detail post-care instructions.
Detail Level: Zone
Pre-Procedure Text: The patient was placed in a recumbant position on the procedure table.
Blade: [place] scalpel
Treatment Areas: face

## 2022-03-10 NOTE — PROCEDURE: HYDRAFACIAL
Procedure: Exfoliation
Number Of Passes: 0
Tip: Hydropeel Tip, Clear
Procedure: Peel
Solution Override
Tip: Hydropeel Tip, Blue
Post-Care Instructions: I reviewed with the patient in detail post-care instructions. Patient should stay away from the sun and wear sun protection until treated areas are fully healed.
Indication: skin texture
Tip: Hydropeel Tip, Teal
Treatment Number: 1
Procedure: Boost
Consent: Written consent obtained, risks reviewed including but not limited to crusting, scabbing, blistering, scarring, darker or lighter pigmentary change, bruising, and/or incomplete response.
Solution: Beta-HD
Procedure: Extend and Protect
Tip Override
Solution: Activ-4
Procedure: Extraction
Solution: GlySal 7.5%
Location: face
Procedure: Fusion

## 2022-03-10 NOTE — PROCEDURE: CHEMICAL PEEL
Detail Level: Zone
Post Peel Care: Arnie Croft protection and post-care instructions were reviewed with the patient.
Chemical Peel: Micropeel 30 Solution
Treatment Number: 0
Treatment Time (Optional): 3 min
Consent: Prior to the procedure, written consent was obtained and risks were reviewed, including but not limited to: redness, peeling, blistering, pigmentary change, scarring, infection, and pain.
Prep: The treated area was degreased with pre-peel cleanser, and vaseline was applied for protection of mucous membranes.
Number Of Layers: 3
Post-Care Instructions: I reviewed with the patient in detail post-care instructions. Patient should avoid sun exposure and wear sun protection.

## 2022-03-28 ENCOUNTER — APPOINTMENT (RX ONLY)
Dept: URBAN - METROPOLITAN AREA CLINIC 126 | Facility: CLINIC | Age: 72
Setting detail: DERMATOLOGY
End: 2022-03-28

## 2022-03-28 DIAGNOSIS — Z41.9 ENCOUNTER FOR PROCEDURE FOR PURPOSES OTHER THAN REMEDYING HEALTH STATE, UNSPECIFIED: ICD-10-CM

## 2022-03-28 PROCEDURE — ? COSMETIC CONSULTATION: FILLERS

## 2022-03-28 PROCEDURE — ? COSMETIC CONSULTATION: SKIN CARE PRODUCTS AND SERVICES

## 2022-04-18 ENCOUNTER — APPOINTMENT (RX ONLY)
Dept: URBAN - METROPOLITAN AREA CLINIC 126 | Facility: CLINIC | Age: 72
Setting detail: DERMATOLOGY
End: 2022-04-18

## 2022-04-18 DIAGNOSIS — Z41.9 ENCOUNTER FOR PROCEDURE FOR PURPOSES OTHER THAN REMEDYING HEALTH STATE, UNSPECIFIED: ICD-10-CM

## 2022-04-18 PROCEDURE — ? JUVEDERM ULTRA PLUS XC INJECTION

## 2022-04-18 ASSESSMENT — LOCATION DETAILED DESCRIPTION DERM: LOCATION DETAILED: LEFT UPPER CUTANEOUS LIP

## 2022-04-18 ASSESSMENT — LOCATION SIMPLE DESCRIPTION DERM: LOCATION SIMPLE: LEFT LIP

## 2022-04-18 ASSESSMENT — LOCATION ZONE DERM: LOCATION ZONE: LIP

## 2022-04-18 NOTE — PROCEDURE: JUVEDERM ULTRA PLUS XC INJECTION
Nasolabial Folds Filler Volume In Cc: 0.4
Dorsal Hands Filler  Volume In Cc: 0
Additional Anesthesia Volume In Cc: 6
Procedural Text: The filler was administered to the treatment areas noted above.
Consent: Written consent obtained. Risks include but not limited to bruising, beading, irregular texture, ulceration, infection, allergic reaction, scar formation, incomplete augmentation, temporary nature, procedural pain.
Detail Level: Detailed
Additional Area 1 Location: oral commisures
Number Of Syringes (Required For Inventory): 1
Additional Area 1 Volume In Cc: 0.2
Lot #: [id number]
Use Map Statement For Sites (Optional): No
Post-Care Instructions: Patient instructed to apply ice to reduce swelling.
Topical Anesthesia?: BLT cream (benzocaine 20%, lidocaine 6%, tetracaine 4%)
Anesthesia Volume In Cc: 0.5
Show Inventory Tab: Hide
Map Statment: See [address] for Complete Details
Filler: Juvederm Ultra Plus XC
Expiration Date (Month Year): 10/2022

## 2022-05-05 ENCOUNTER — APPOINTMENT (RX ONLY)
Dept: URBAN - METROPOLITAN AREA CLINIC 126 | Facility: CLINIC | Age: 72
Setting detail: DERMATOLOGY
End: 2022-05-05

## 2022-05-05 DIAGNOSIS — Z41.9 ENCOUNTER FOR PROCEDURE FOR PURPOSES OTHER THAN REMEDYING HEALTH STATE, UNSPECIFIED: ICD-10-CM

## 2022-05-05 PROCEDURE — ? CHEMICAL PEEL

## 2022-05-05 PROCEDURE — ? HYDRAFACIAL

## 2022-05-05 PROCEDURE — ? DERMAPLANE

## 2022-05-05 ASSESSMENT — LOCATION DETAILED DESCRIPTION DERM
LOCATION DETAILED: LEFT INFERIOR CENTRAL MALAR CHEEK
LOCATION DETAILED: LEFT CENTRAL MALAR CHEEK
LOCATION DETAILED: SUPERIOR THORACIC SPINE

## 2022-05-05 ASSESSMENT — LOCATION SIMPLE DESCRIPTION DERM
LOCATION SIMPLE: LEFT CHEEK
LOCATION SIMPLE: UPPER BACK

## 2022-05-05 ASSESSMENT — LOCATION ZONE DERM
LOCATION ZONE: FACE
LOCATION ZONE: TRUNK

## 2022-05-05 NOTE — PROCEDURE: DERMAPLANE
Treatment Areas: face
Pre-Procedure Text: The patient was placed in a recumbant position on the procedure table.
Blade: [place] scalpel
Treatment Area Prep: alcohol
Detail Level: Zone
Post-Procedure Instructions: Following the dermaplane procedure, Oxymist treatment was applied to the treatment areas. Moisturizer and SPF was applied.
Post-Care Instructions: I reviewed with the patient in detail post-care instructions.

## 2022-05-05 NOTE — PROCEDURE: CHEMICAL PEEL
Number Of Layers: 3
Consent: Prior to the procedure, written consent was obtained and risks were reviewed, including but not limited to: redness, peeling, blistering, pigmentary change, scarring, infection, and pain.
Post-Care Instructions: I reviewed with the patient in detail post-care instructions. Patient should avoid sun exposure and wear sun protection.
Post Peel Care: Jayden Weathers protection and post-care instructions were reviewed with the patient.
Detail Level: Zone
Treatment Number: 0
Treatment Time (Optional): 3 min
Chemical Peel: Micropeel 30 Solution
Prep: The treated area was degreased with pre-peel cleanser, and vaseline was applied for protection of mucous membranes.

## 2022-05-05 NOTE — PROCEDURE: HYDRAFACIAL
Number Of Passes: 0
Tip: Hydropeel Tip, Blue
Procedure: Extend and Protect
Consent: Written consent obtained, risks reviewed including but not limited to crusting, scabbing, blistering, scarring, darker or lighter pigmentary change, bruising, and/or incomplete response.
Solution Override
Procedure: Exfoliation
Post-Care Instructions: I reviewed with the patient in detail post-care instructions. Patient should stay away from the sun and wear sun protection until treated areas are fully healed.
Location: face
Tip: Hydropeel Tip, Teal
Solution: Beta-HD
Tip: Hydropeel Tip, Clear
Solution: Activ-4
Procedure: Fusion
Procedure: Peel
Treatment Number: 1
Solution: GlySal 7.5%
Procedure: Boost
Tip Override
Indication: skin texture
Procedure: Extraction

## 2022-11-03 ENCOUNTER — APPOINTMENT (RX ONLY)
Dept: URBAN - METROPOLITAN AREA CLINIC 126 | Facility: CLINIC | Age: 72
Setting detail: DERMATOLOGY
End: 2022-11-03

## 2022-11-03 DIAGNOSIS — Z41.9 ENCOUNTER FOR PROCEDURE FOR PURPOSES OTHER THAN REMEDYING HEALTH STATE, UNSPECIFIED: ICD-10-CM

## 2022-11-03 PROCEDURE — ? DERMAPLANE

## 2022-11-03 PROCEDURE — ? CHEMICAL PEEL

## 2022-11-03 PROCEDURE — ? HYDRAFACIAL

## 2022-11-03 ASSESSMENT — LOCATION SIMPLE DESCRIPTION DERM
LOCATION SIMPLE: RIGHT CHEEK
LOCATION SIMPLE: LEFT UPPER BACK
LOCATION SIMPLE: LEFT CHEEK

## 2022-11-03 ASSESSMENT — LOCATION ZONE DERM
LOCATION ZONE: FACE
LOCATION ZONE: TRUNK

## 2022-11-03 ASSESSMENT — LOCATION DETAILED DESCRIPTION DERM
LOCATION DETAILED: LEFT SUPERIOR MEDIAL UPPER BACK
LOCATION DETAILED: RIGHT INFERIOR CENTRAL MALAR CHEEK
LOCATION DETAILED: LEFT INFERIOR CENTRAL MALAR CHEEK

## 2022-11-03 NOTE — PROCEDURE: DERMAPLANE
Price (Use Numbers Only, No Special Characters Or $): [address] [facility]
Treatment Areas: face
Post-Care Instructions: I reviewed with the patient in detail post-care instructions.
Treatment Area Prep: alcohol
Pre-Procedure Text: The patient was placed in a recumbant position on the procedure table.
Post-Procedure Instructions: Following the dermaplane procedure, Oxymist treatment was applied to the treatment areas. Moisturizer and SPF was applied.
Detail Level: Zone
Blade: [place] scalpel

## 2022-11-03 NOTE — PROCEDURE: CHEMICAL PEEL
Detail Level: Zone
Chemical Peel: Micropeel 30 Solution
Post-Care Instructions: I reviewed with the patient in detail post-care instructions. Patient should avoid sun exposure and wear sun protection.
Prep: The treated area was degreased with pre-peel cleanser, and vaseline was applied for protection of mucous membranes.
Treatment Time (Optional): 3 min
Treatment Number: 0
Post Peel Care: [facility] protection and post-care instructions were reviewed with the patient.
Consent: Prior to the procedure, written consent was obtained and risks were reviewed, including but not limited to: redness, peeling, blistering, pigmentary change, scarring, infection, and pain.
Number Of Layers: 3
Price (Use Numbers Only, No Special Characters Or $): Unique

## 2022-11-03 NOTE — PROCEDURE: HYDRAFACIAL
Vacuum Pressure Low Setting (Will Not Render If Set To 0): 0
Tip: Hydropeel Tip, Teal
Solution: Activ-4
Post-Care Instructions: I reviewed with the patient in detail post-care instructions. Patient should stay away from the sun and wear sun protection until treated areas are fully healed.
Consent: Written consent obtained, risks reviewed including but not limited to crusting, scabbing, blistering, scarring, darker or lighter pigmentary change, bruising, and/or incomplete response.
Procedure: Extraction
Price (Use Numbers Only, No Special Characters Or $): Paige [id number]
Solution Override
Procedure: Extend and Protect
Tip Override
Treatment Number: 1
Procedure: Peel
Solution: Beta-HD
Indication: skin texture
Tip: Hydropeel Tip, Clear
Procedure: Fusion
Location: face
Tip: Hydropeel Tip, Blue
Procedure: Exfoliation
Solution: GlySal 7.5%
Procedure: Boost

## 2022-12-01 ENCOUNTER — APPOINTMENT (RX ONLY)
Dept: URBAN - METROPOLITAN AREA CLINIC 126 | Facility: CLINIC | Age: 72
Setting detail: DERMATOLOGY
End: 2022-12-01

## 2022-12-01 DIAGNOSIS — Z41.9 ENCOUNTER FOR PROCEDURE FOR PURPOSES OTHER THAN REMEDYING HEALTH STATE, UNSPECIFIED: ICD-10-CM

## 2022-12-01 PROCEDURE — ? DERMAPLANE

## 2022-12-01 PROCEDURE — ? CHEMICAL PEEL

## 2022-12-01 PROCEDURE — ? HYDRAFACIAL

## 2022-12-01 ASSESSMENT — LOCATION DETAILED DESCRIPTION DERM
LOCATION DETAILED: LEFT CENTRAL MALAR CHEEK
LOCATION DETAILED: LEFT INFERIOR CENTRAL MALAR CHEEK
LOCATION DETAILED: RIGHT SUPERIOR MEDIAL UPPER BACK

## 2022-12-01 ASSESSMENT — LOCATION SIMPLE DESCRIPTION DERM
LOCATION SIMPLE: RIGHT UPPER BACK
LOCATION SIMPLE: LEFT CHEEK

## 2022-12-01 ASSESSMENT — LOCATION ZONE DERM
LOCATION ZONE: TRUNK
LOCATION ZONE: FACE

## 2022-12-01 NOTE — PROCEDURE: DERMAPLANE
Treatment Areas: face
Blade: [place] scalpel
Post-Procedure Instructions: Following the dermaplane procedure, Oxymist treatment was applied to the treatment areas. Moisturizer and SPF was applied.
Price (Use Numbers Only, No Special Characters Or $): 8[address] [facility]
Post-Care Instructions: I reviewed with the patient in detail post-care instructions.
Treatment Area Prep: alcohol
Detail Level: Zone
Pre-Procedure Text: The patient was placed in a recumbant position on the procedure table.

## 2022-12-01 NOTE — PROCEDURE: HYDRAFACIAL
Price (Use Numbers Only, No Special Characters Or $): Paige [id number]
Treatment Number: 1
Solution: Beta-HD
Vacuum Pressure High Setting (Will Not Render If Set To 0): 0
Tip: Hydropeel Tip, Clear
Procedure: Fusion
Post-Care Instructions: I reviewed with the patient in detail post-care instructions. Patient should stay away from the sun and wear sun protection until treated areas are fully healed.
Solution Override
Tip: Hydropeel Tip, Blue
Tip Override
Indication: skin texture
Tip: Hydropeel Tip, Teal
Procedure: Exfoliation
Location: face
Procedure: Peel
Procedure: Extend and Protect
Solution: Activ-4
Solution: GlySal 7.5%
Procedure: Extraction
Procedure: Boost
Consent: Written consent obtained, risks reviewed including but not limited to crusting, scabbing, blistering, scarring, darker or lighter pigmentary change, bruising, and/or incomplete response.

## 2022-12-01 NOTE — PROCEDURE: CHEMICAL PEEL
Prep: The treated area was degreased with pre-peel cleanser, and vaseline was applied for protection of mucous membranes.
Post-Care Instructions: I reviewed with the patient in detail post-care instructions. Patient should avoid sun exposure and wear sun protection.
Treatment Number: 0
Post Peel Care: Roscoe Rame[place]mont protection and post-care instructions were reviewed with the patient.
Detail Level: Zone
Consent: Prior to the procedure, written consent was obtained and risks were reviewed, including but not limited to: redness, peeling, blistering, pigmentary change, scarring, infection, and pain.
Number Of Layers: 3
Chemical Peel: Micropeel 20 Solution
Treatment Time (Optional): 3 min
Price (Use Numbers Only, No Special Characters Or $): Unique

## 2023-02-02 ENCOUNTER — APPOINTMENT (RX ONLY)
Dept: URBAN - METROPOLITAN AREA CLINIC 126 | Facility: CLINIC | Age: 73
Setting detail: DERMATOLOGY
End: 2023-02-02

## 2023-02-02 DIAGNOSIS — Z41.9 ENCOUNTER FOR PROCEDURE FOR PURPOSES OTHER THAN REMEDYING HEALTH STATE, UNSPECIFIED: ICD-10-CM

## 2023-02-02 PROCEDURE — ? DERMAPLANE

## 2023-02-02 PROCEDURE — ? HYDRAFACIAL

## 2023-02-02 PROCEDURE — ? CHEMICAL PEEL

## 2023-02-02 ASSESSMENT — LOCATION ZONE DERM: LOCATION ZONE: FACE

## 2023-02-02 ASSESSMENT — LOCATION DETAILED DESCRIPTION DERM
LOCATION DETAILED: LEFT SUPERIOR CENTRAL BUCCAL CHEEK
LOCATION DETAILED: LEFT INFERIOR CENTRAL MALAR CHEEK
LOCATION DETAILED: RIGHT INFERIOR CENTRAL MALAR CHEEK

## 2023-02-02 ASSESSMENT — LOCATION SIMPLE DESCRIPTION DERM
LOCATION SIMPLE: RIGHT CHEEK
LOCATION SIMPLE: LEFT CHEEK

## 2023-02-02 NOTE — PROCEDURE: HYDRAFACIAL
Indication: skin texture
Tip: Hydropeel Tip, Blue
Vacuum Pressure High Setting (Will Not Render If Set To 0): 0
Solution Override
Procedure: Fusion
Solution: Beta-HD
Procedure: Exfoliation
Tip: Hydropeel Tip, Teal
Tip: Hydropeel Tip, Clear
Procedure: Peel
Consent: Written consent obtained, risks reviewed including but not limited to crusting, scabbing, blistering, scarring, darker or lighter pigmentary change, bruising, and/or incomplete response.
Location: face
Tip Override
Procedure: Extraction
Solution: Activ-4
Post-Care Instructions: I reviewed with the patient in detail post-care instructions. Patient should stay away from the sun and wear sun protection until treated areas are fully healed.
Procedure: Extend and Protect
Solution: GlySal 7.5%
Price (Use Numbers Only, No Special Characters Or $): Paige [id number]
Procedure: Boost
Treatment Number: 1

## 2023-02-02 NOTE — PROCEDURE: DERMAPLANE
Pre-Procedure Text: The patient was placed in a recumbant position on the procedure table.
Detail Level: Zone
Post-Care Instructions: I reviewed with the patient in detail post-care instructions.
Treatment Area Prep: alcohol
Treatment Areas: face
Price (Use Numbers Only, No Special Characters Or $): 09[address] [facility]
Post-Procedure Instructions: Following the dermaplane procedure, Oxymist treatment was applied to the treatment areas. Moisturizer and SPF was applied.
Blade: [place] scalpel

## 2023-02-02 NOTE — PROCEDURE: CHEMICAL PEEL
Number Of Layers: 3
Chemical Peel: Micropeel 30 Solution
Detail Level: Zone
Consent: Prior to the procedure, written consent was obtained and risks were reviewed, including but not limited to: redness, peeling, blistering, pigmentary change, scarring, infection, and pain.
Prep: The treated area was degreased with pre-peel cleanser, and vaseline was applied for protection of mucous membranes.
Treatment Time (Optional): 3 min
Treatment Number: 0
Price (Use Numbers Only, No Special Characters Or $): Unique
Post-Care Instructions: I reviewed with the patient in detail post-care instructions. Patient should avoid sun exposure and wear sun protection.
Post Peel Care: Chip Rouge protection and post-care instructions were reviewed with the patient.

## 2023-02-07 ENCOUNTER — APPOINTMENT (RX ONLY)
Dept: URBAN - METROPOLITAN AREA CLINIC 126 | Facility: CLINIC | Age: 73
Setting detail: DERMATOLOGY
End: 2023-02-07

## 2023-02-07 DIAGNOSIS — L82.1 OTHER SEBORRHEIC KERATOSIS: ICD-10-CM

## 2023-02-07 DIAGNOSIS — L81.4 OTHER MELANIN HYPERPIGMENTATION: ICD-10-CM

## 2023-02-07 DIAGNOSIS — Z71.89 OTHER SPECIFIED COUNSELING: ICD-10-CM

## 2023-02-07 DIAGNOSIS — L82.0 INFLAMED SEBORRHEIC KERATOSIS: ICD-10-CM

## 2023-02-07 DIAGNOSIS — L90.5 SCAR CONDITIONS AND FIBROSIS OF SKIN: ICD-10-CM

## 2023-02-07 DIAGNOSIS — I87.2 VENOUS INSUFFICIENCY (CHRONIC) (PERIPHERAL): ICD-10-CM

## 2023-02-07 DIAGNOSIS — D18.0 HEMANGIOMA: ICD-10-CM

## 2023-02-07 PROBLEM — D18.01 HEMANGIOMA OF SKIN AND SUBCUTANEOUS TISSUE: Status: ACTIVE | Noted: 2023-02-07

## 2023-02-07 PROBLEM — D23.71 OTHER BENIGN NEOPLASM OF SKIN OF RIGHT LOWER LIMB, INCLUDING HIP: Status: ACTIVE | Noted: 2023-02-07

## 2023-02-07 PROCEDURE — ? COUNSELING

## 2023-02-07 PROCEDURE — 99213 OFFICE O/P EST LOW 20 MIN: CPT | Mod: 25

## 2023-02-07 PROCEDURE — ? DIAGNOSIS COMMENT

## 2023-02-07 PROCEDURE — ? SUNSCREEN RECOMMENDATIONS

## 2023-02-07 PROCEDURE — ? LIQUID NITROGEN

## 2023-02-07 PROCEDURE — 17110 DESTRUCTION B9 LES UP TO 14: CPT

## 2023-02-07 ASSESSMENT — LOCATION DETAILED DESCRIPTION DERM
LOCATION DETAILED: RIGHT INFERIOR MEDIAL MIDBACK
LOCATION DETAILED: RIGHT SUPERIOR MEDIAL MIDBACK
LOCATION DETAILED: LEFT DISTAL POSTERIOR UPPER ARM
LOCATION DETAILED: LEFT DISTAL PRETIBIAL REGION
LOCATION DETAILED: LEFT INFERIOR MEDIAL MIDBACK
LOCATION DETAILED: RIGHT MEDIAL SUPERIOR CHEST
LOCATION DETAILED: RIGHT LATERAL ABDOMEN
LOCATION DETAILED: LEFT MID-UPPER BACK
LOCATION DETAILED: EPIGASTRIC SKIN
LOCATION DETAILED: RIGHT SUPERIOR UPPER BACK
LOCATION DETAILED: LEFT SUPERIOR MEDIAL MIDBACK
LOCATION DETAILED: RIGHT INFERIOR MEDIAL UPPER BACK
LOCATION DETAILED: LEFT INFERIOR MEDIAL UPPER BACK
LOCATION DETAILED: LEFT LATERAL ABDOMEN
LOCATION DETAILED: RIGHT DISTAL PRETIBIAL REGION
LOCATION DETAILED: LEFT SUPERIOR UPPER BACK
LOCATION DETAILED: LEFT SUPERIOR MEDIAL UPPER BACK
LOCATION DETAILED: LEFT INFRAMAMMARY CREASE (INNER QUADRANT)
LOCATION DETAILED: RIGHT MID-UPPER BACK

## 2023-02-07 ASSESSMENT — LOCATION SIMPLE DESCRIPTION DERM
LOCATION SIMPLE: RIGHT UPPER BACK
LOCATION SIMPLE: LEFT POSTERIOR UPPER ARM
LOCATION SIMPLE: LEFT PRETIBIAL REGION
LOCATION SIMPLE: LEFT UPPER BACK
LOCATION SIMPLE: LEFT BREAST
LOCATION SIMPLE: RIGHT LOWER BACK
LOCATION SIMPLE: CHEST
LOCATION SIMPLE: RIGHT PRETIBIAL REGION
LOCATION SIMPLE: ABDOMEN
LOCATION SIMPLE: LEFT LOWER BACK

## 2023-02-07 ASSESSMENT — LOCATION ZONE DERM
LOCATION ZONE: ARM
LOCATION ZONE: TRUNK
LOCATION ZONE: LEG

## 2023-02-07 NOTE — PROCEDURE: LIQUID NITROGEN
Add 52 Modifier (Optional): no
Number Of Freeze-Thaw Cycles: 3 freeze-thaw cycles
Medical Necessity Information: It is in your best interest to select a reason for this procedure from the list below. All of these items fulfill various CMS LCD requirements except the new and changing color options.
Medical Necessity Clause: This procedure was medically necessary because the lesions that were treated were:
Consent: The patient's consent was obtained including but not limited to risks of crusting, scabbing, blistering, scarring, darker or lighter pigmentary change, recurrence, incomplete removal and infection.
Spray Paint Text: The liquid nitrogen was applied to the skin utilizing a spray paint frosting technique.
Render Post-Care Instructions In Note?: yes
Post-Care Instructions: I reviewed with the patient in detail post-care instructions. Patient is to wear sunprotection, and avoid picking at any of the treated lesions. Pt may apply Vaseline to crusted or scabbing areas.
Detail Level: Simple

## 2023-02-07 NOTE — PROCEDURE: DIAGNOSIS COMMENT
Render Risk Assessment In Note?: no
Detail Level: Zone
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

## 2023-02-21 ENCOUNTER — APPOINTMENT (RX ONLY)
Dept: URBAN - METROPOLITAN AREA CLINIC 126 | Facility: CLINIC | Age: 73
Setting detail: DERMATOLOGY
End: 2023-02-21

## 2023-02-21 DIAGNOSIS — Z41.9 ENCOUNTER FOR PROCEDURE FOR PURPOSES OTHER THAN REMEDYING HEALTH STATE, UNSPECIFIED: ICD-10-CM

## 2023-02-21 PROCEDURE — ? SCITON MOXI

## 2023-02-21 PROCEDURE — ? SCITON BBL HERO

## 2023-02-21 ASSESSMENT — LOCATION SIMPLE DESCRIPTION DERM
LOCATION SIMPLE: LEFT ANTERIOR NECK
LOCATION SIMPLE: RIGHT CHEEK
LOCATION SIMPLE: LEFT CHEEK

## 2023-02-21 ASSESSMENT — LOCATION DETAILED DESCRIPTION DERM
LOCATION DETAILED: LEFT INFERIOR CENTRAL MALAR CHEEK
LOCATION DETAILED: RIGHT INFERIOR CENTRAL MALAR CHEEK
LOCATION DETAILED: LEFT SUPERIOR LATERAL NECK

## 2023-02-21 ASSESSMENT — LOCATION ZONE DERM
LOCATION ZONE: FACE
LOCATION ZONE: NECK

## 2023-02-21 NOTE — PROCEDURE: SCITON BBL HERO
Pulse Width - Will Not Render If 0: [address] [facility]
Detail Level: Zone
Spot Size: Finesse Adapter Size: 15 x 15 mm square
Filter: 515nm Filter
Fluence (J/Cm2) - Will Not Render If 0: [address]
Fluence (J/Cm2) - Will Not Render If 0: 6
Pulse Width - Will Not Render If 0: 3
Pulse Width Units: milliseconds
Fluence (J/Cm2) - Will Not Render If 0: 0
Total Pulses (Optional): 12 nose jonathon
Add Setting 3?: yes
Spot Size: Finesse Adapter Size: 7 mm round
Total Pulses (Optional): 43 cheeks rosacea
Price (Use Numbers Only, No Special Characters Or $): Geronimo Lopez
Temp (C): 25
Fluence (J/Cm2) - Will Not Render If 0: 20
Filter: 560nm Filter
Add Setting 6?: no
Procedure Note: After applying gel and applying protective eyeware, treatment was administered using the setting parameters listed above.
Total Pulses (Optional): 400 face 200 neck
Spot Size: Finesse Adapter Size: 15 x 45 mm (No Finesse Adapter)
Consent: Written consent obtained. Risks reviewed including but not limited to crusting, scabbing, blistering, scarring, darker or lighter pigmentary change, and incomplete clearance.
Post-Care Instructions: I reviewed with the patient in detail post-care instructions. Patient should avoid sun exposure before and after treatment. Patient should wear sunscreen.
Total Pulses (Optional): 300 cheeks
Anesthesia Type: 1% lidocaine with epinephrine

## 2023-02-21 NOTE — PROCEDURE: SCITON MOXI
Detail Level: Detailed
Total Coverage (%): [phone] [facility]
Price (Use Numbers Only, No Special Characters Or $): Geronimo Lopez
External Cooling Fan Speed: 5
Level 3
Pre=procedure Text: After consent was obtained, the treatment areas were cleaned and treated using the parameters noted above.
Total Accumulated Energy (J): [address]
Post-Care Instructions: I reviewed with the patient in detail post-care instructions. Recommended diligent sun protection and sun avoidance.
Anesthesia Type: 1% lidocaine with epinephrine
Laser Type: Fractionated 1927nm, thulium laser
Treatment Number: 1
Energy (J/Cm2): 20
Number Of Passes: 6
Consent: Written consent obtained. Risks were reviewed including but not limited to crusting, scabbing, blistering, scarring, darker or lighter pigmentary change, incomplete improvement, prolonged erythema and facial swelling, infection, and bleeding.

## 2023-03-02 ENCOUNTER — APPOINTMENT (RX ONLY)
Dept: URBAN - METROPOLITAN AREA CLINIC 126 | Facility: CLINIC | Age: 73
Setting detail: DERMATOLOGY
End: 2023-03-02

## 2023-03-02 DIAGNOSIS — Z41.9 ENCOUNTER FOR PROCEDURE FOR PURPOSES OTHER THAN REMEDYING HEALTH STATE, UNSPECIFIED: ICD-10-CM

## 2023-03-02 PROCEDURE — ? CHEMICAL PEEL

## 2023-03-02 PROCEDURE — ? DERMAPLANE

## 2023-03-02 PROCEDURE — ? HYDRAFACIAL

## 2023-03-02 ASSESSMENT — LOCATION DETAILED DESCRIPTION DERM
LOCATION DETAILED: LEFT SUPERIOR MEDIAL UPPER BACK
LOCATION DETAILED: LEFT INFERIOR CENTRAL MALAR CHEEK

## 2023-03-02 ASSESSMENT — LOCATION SIMPLE DESCRIPTION DERM
LOCATION SIMPLE: LEFT CHEEK
LOCATION SIMPLE: LEFT UPPER BACK

## 2023-03-02 ASSESSMENT — LOCATION ZONE DERM
LOCATION ZONE: TRUNK
LOCATION ZONE: FACE

## 2023-03-02 NOTE — PROCEDURE: CHEMICAL PEEL
Post-Care Instructions: I reviewed with the patient in detail post-care instructions. Patient should avoid sun exposure and wear sun protection.
Chemical Peel: Micropeel 20 Solution
Detail Level: Zone
Treatment Number: 0
Consent: Prior to the procedure, written consent was obtained and risks were reviewed, including but not limited to: redness, peeling, blistering, pigmentary change, scarring, infection, and pain.
Price (Use Numbers Only, No Special Characters Or $): Unique
Prep: The treated area was degreased with pre-peel cleanser, and vaseline was applied for protection of mucous membranes.
Treatment Time (Optional): 3 min
Post Peel Care: [facility] protection and post-care instructions were reviewed with the patient.
Number Of Layers: 3

## 2023-03-02 NOTE — PROCEDURE: HYDRAFACIAL
Procedure: Fusion
Vacuum Pressure Low Setting (Will Not Render If Set To 0): 0
Indication: skin texture
Tip Override
Consent: Written consent obtained, risks reviewed including but not limited to crusting, scabbing, blistering, scarring, darker or lighter pigmentary change, bruising, and/or incomplete response.
Tip: Hydropeel Tip, Teal
Procedure: Exfoliation
Solution Override
Post-Care Instructions: I reviewed with the patient in detail post-care instructions. Patient should stay away from the sun and wear sun protection until treated areas are fully healed.
Procedure: Extend and Protect
Location: face
Procedure: Peel
Solution: Activ-4
Solution: GlySal 7.5%
Tip: Hydropeel Tip, Clear
Procedure: Extraction
Procedure: Boost
Treatment Number: 1
Solution: Beta-HD
Tip: Hydropeel Tip, Blue
Price (Use Numbers Only, No Special Characters Or $): Paige 074

## 2023-03-02 NOTE — PROCEDURE: DERMAPLANE
Post-Procedure Instructions: Following the dermaplane procedure, Oxymist treatment was applied to the treatment areas. Moisturizer and SPF was applied.
Blade: [place] scalpel
Treatment Areas: face
Pre-Procedure Text: The patient was placed in a recumbant position on the procedure table.
Post-Care Instructions: I reviewed with the patient in detail post-care instructions.
Detail Level: Zone
Price (Use Numbers Only, No Special Characters Or $): 7[facility]
Treatment Area Prep: alcohol

## 2023-03-14 NOTE — PROCEDURE: OTHER
----- Message from Naomy Mccloud MD sent at 3/6/2023  6:03 PM EST -----  Regarding: RE: ENT referral  She should make an appointment  ----- Message -----  From: Vee Winter CMA  Sent: 3/6/2023   3:39 PM EST  To: Naomy Mccloud MD  Subject: ENT referral                                     Patient called she has had nasal congestion and nose bleeds for months and would like a referral to an ENT. Or should she come see you first?       
Other (Free Text): Refer to Dr. Yvonne Damon for excision
Note Text (......Xxx Chief Complaint.): This diagnosis correlates with the
Detail Level: Zone

## 2023-04-06 ENCOUNTER — APPOINTMENT (RX ONLY)
Dept: URBAN - METROPOLITAN AREA CLINIC 126 | Facility: CLINIC | Age: 73
Setting detail: DERMATOLOGY
End: 2023-04-06

## 2023-04-06 DIAGNOSIS — Z41.9 ENCOUNTER FOR PROCEDURE FOR PURPOSES OTHER THAN REMEDYING HEALTH STATE, UNSPECIFIED: ICD-10-CM

## 2023-04-06 PROCEDURE — ? CHEMICAL PEEL

## 2023-04-06 PROCEDURE — ? HYDRAFACIAL

## 2023-04-06 PROCEDURE — ? DERMAPLANE

## 2023-04-06 ASSESSMENT — LOCATION SIMPLE DESCRIPTION DERM
LOCATION SIMPLE: RIGHT CHEEK
LOCATION SIMPLE: LEFT CHEEK

## 2023-04-06 ASSESSMENT — LOCATION DETAILED DESCRIPTION DERM
LOCATION DETAILED: LEFT CENTRAL MALAR CHEEK
LOCATION DETAILED: LEFT INFERIOR MEDIAL MALAR CHEEK
LOCATION DETAILED: RIGHT INFERIOR CENTRAL MALAR CHEEK

## 2023-04-06 ASSESSMENT — LOCATION ZONE DERM: LOCATION ZONE: FACE

## 2023-04-06 NOTE — PROCEDURE: DERMAPLANE
Post-Procedure Instructions: Following the dermaplane procedure, Oxymist treatment was applied to the treatment areas. Moisturizer and SPF was applied.
Blade: [place] scalpel
Treatment Areas: face
Treatment Area Prep: alcohol
Post-Care Instructions: I reviewed with the patient in detail post-care instructions.
Pre-Procedure Text: The patient was placed in a recumbant position on the procedure table.
Detail Level: Zone
Price (Use Numbers Only, No Special Characters Or $): 95[address] [facility]

## 2023-04-06 NOTE — PROCEDURE: HYDRAFACIAL
Vacuum Pressure High Setting (Will Not Render If Set To 0): 0
Tip: Hydropeel Tip, Clear
Tip: Hydropeel Tip, Teal
Procedure: Extend and Protect
Tip: Hydropeel Tip, Blue
Solution Override
Procedure: Extraction
Treatment Number: 1
Procedure: Fusion
Price (Use Numbers Only, No Special Characters Or $): Paige 291
Procedure: Boost
Solution: Beta-HD
Indication: skin texture
Procedure: Exfoliation
Consent: Written consent obtained, risks reviewed including but not limited to crusting, scabbing, blistering, scarring, darker or lighter pigmentary change, bruising, and/or incomplete response.
Procedure: Peel
Post-Care Instructions: I reviewed with the patient in detail post-care instructions. Patient should stay away from the sun and wear sun protection until treated areas are fully healed.
Location: face
Tip Override
Solution: GlySal 7.5%
Solution: Activ-4

## 2023-04-06 NOTE — PROCEDURE: CHEMICAL PEEL
Number Of Layers: 3
Treatment Number: 0
Post-Care Instructions: I reviewed with the patient in detail post-care instructions. Patient should avoid sun exposure and wear sun protection.
Treatment Time (Optional): 3 min
Post Peel Care: Robbi Burks protection and post-care instructions were reviewed with the patient.
Chemical Peel: Micropeel 20 Solution
Price (Use Numbers Only, No Special Characters Or $): Unique
Prep: The treated area was degreased with pre-peel cleanser, and vaseline was applied for protection of mucous membranes.
Detail Level: Zone
Consent: Prior to the procedure, written consent was obtained and risks were reviewed, including but not limited to: redness, peeling, blistering, pigmentary change, scarring, infection, and pain.

## 2023-05-04 ENCOUNTER — APPOINTMENT (RX ONLY)
Dept: URBAN - METROPOLITAN AREA CLINIC 126 | Facility: CLINIC | Age: 73
Setting detail: DERMATOLOGY
End: 2023-05-04

## 2023-05-04 DIAGNOSIS — Z41.9 ENCOUNTER FOR PROCEDURE FOR PURPOSES OTHER THAN REMEDYING HEALTH STATE, UNSPECIFIED: ICD-10-CM

## 2023-05-04 PROCEDURE — ? HYDRAFACIAL

## 2023-05-04 PROCEDURE — ? DERMAPLANE

## 2023-05-04 PROCEDURE — ? CHEMICAL PEEL

## 2023-05-04 ASSESSMENT — LOCATION SIMPLE DESCRIPTION DERM: LOCATION SIMPLE: LEFT CHEEK

## 2023-05-04 ASSESSMENT — LOCATION DETAILED DESCRIPTION DERM
LOCATION DETAILED: LEFT CENTRAL MALAR CHEEK
LOCATION DETAILED: LEFT INFERIOR CENTRAL MALAR CHEEK
LOCATION DETAILED: LEFT SUPERIOR CENTRAL BUCCAL CHEEK

## 2023-05-04 ASSESSMENT — LOCATION ZONE DERM: LOCATION ZONE: FACE

## 2023-05-04 NOTE — PROCEDURE: DERMAPLANE
Blade: [place] scalpel
Treatment Areas: face
Post-Procedure Instructions: Following the dermaplane procedure, Oxymist treatment was applied to the treatment areas. Moisturizer and SPF was applied.
Pre-Procedure Text: The patient was placed in a recumbant position on the procedure table.
Price (Use Numbers Only, No Special Characters Or $): [facility]
Treatment Area Prep: alcohol
Detail Level: Zone
Post-Care Instructions: I reviewed with the patient in detail post-care instructions.

## 2023-05-04 NOTE — PROCEDURE: CHEMICAL PEEL
Treatment Time (Optional): 3 min
Chemical Peel: Micropeel 20 Solution
Consent: Prior to the procedure, written consent was obtained and risks were reviewed, including but not limited to: redness, peeling, blistering, pigmentary change, scarring, infection, and pain.
Detail Level: Zone
Post-Care Instructions: I reviewed with the patient in detail post-care instructions. Patient should avoid sun exposure and wear sun protection.
Price (Use Numbers Only, No Special Characters Or $): Unique
Number Of Layers: 3
Prep: The treated area was degreased with pre-peel cleanser, and vaseline was applied for protection of mucous membranes.
Treatment Number: 0
Post Peel Care: Porfirio Helm protection and post-care instructions were reviewed with the patient.

## 2023-05-04 NOTE — PROCEDURE: HYDRAFACIAL
Number Of Passes: 0
Solution Override
Treatment Number: 1
Tip: Hydropeel Tip, Clear
Tip: Hydropeel Tip, Teal
Procedure: Boost
Price (Use Numbers Only, No Special Characters Or $): Paige 131
Tip: Hydropeel Tip, Blue
Procedure: Extend and Protect
Indication: skin texture
Procedure: Extraction
Tip Override
Procedure: Exfoliation
Solution: Activ-4
Solution: Beta-HD
Procedure: Peel
Location: face
Procedure: Fusion
Consent: Written consent obtained, risks reviewed including but not limited to crusting, scabbing, blistering, scarring, darker or lighter pigmentary change, bruising, and/or incomplete response.
Solution: GlySal 7.5%
Post-Care Instructions: I reviewed with the patient in detail post-care instructions. Patient should stay away from the sun and wear sun protection until treated areas are fully healed.

## 2023-11-02 ENCOUNTER — APPOINTMENT (RX ONLY)
Dept: URBAN - METROPOLITAN AREA CLINIC 126 | Facility: CLINIC | Age: 73
Setting detail: DERMATOLOGY
End: 2023-11-02

## 2023-11-02 DIAGNOSIS — Z41.9 ENCOUNTER FOR PROCEDURE FOR PURPOSES OTHER THAN REMEDYING HEALTH STATE, UNSPECIFIED: ICD-10-CM

## 2023-11-02 PROCEDURE — ? SIGNATURE HYDRAFACIAL

## 2023-11-02 PROCEDURE — ? CHEMICAL PEEL GLYCOLIC ACID

## 2023-11-02 PROCEDURE — ? DERMAPLANE

## 2023-11-02 ASSESSMENT — LOCATION DETAILED DESCRIPTION DERM
LOCATION DETAILED: LEFT INFERIOR CENTRAL MALAR CHEEK
LOCATION DETAILED: LEFT SUPERIOR CENTRAL BUCCAL CHEEK
LOCATION DETAILED: SUPERIOR THORACIC SPINE

## 2023-11-02 ASSESSMENT — LOCATION SIMPLE DESCRIPTION DERM
LOCATION SIMPLE: UPPER BACK
LOCATION SIMPLE: LEFT CHEEK

## 2023-11-02 ASSESSMENT — LOCATION ZONE DERM
LOCATION ZONE: TRUNK
LOCATION ZONE: FACE

## 2023-11-02 NOTE — PROCEDURE: SIGNATURE HYDRAFACIAL
Tip Override
Solution Override
Consent: Written consent obtained, risks reviewed including but not limited to crusting, scabbing, blistering, scarring, darker or lighter pigmentary change, bruising, and/or incomplete response.
Procedure: Extend and Protect
Vacuum Pressure High Setting (Will Not Render If Set To 0): 0
Treatment Number: 1
Tip: Hydropeel Tip, Teal
Solution: Beta-HD
Procedure: Peel
Tip: Hydropeel Tip, Clear
Tip: Hydropeel Tip, Blue
Post-Care Instructions: I reviewed with the patient in detail post-care instructions. Patient should stay away from the sun and wear sun protection until treated areas are fully healed.
Procedure: Fusion
Location: face
Price (Use Numbers Only, No Special Characters Or $): Paige 613
Procedure: Exfoliation
Solution: GlySal 7.5%
Procedure: Boost
Indication: skin texture
Solution: Activ-4
Procedure: Extraction

## 2023-11-02 NOTE — PROCEDURE: DERMAPLANE
Pre-Procedure Text: The patient was placed in a recumbant position on the procedure table.
Treatment Areas: face
Post-Procedure Instructions: Following the dermaplane procedure, Oxymist treatment was applied to the treatment areas. Moisturizer and SPF was applied.
Price (Use Numbers Only, No Special Characters Or $): [facility]
Detail Level: Zone
Post-Care Instructions: I reviewed with the patient in detail post-care instructions.
Blade: [place] scalpel
Treatment Area Prep: alcohol

## 2023-11-02 NOTE — PROCEDURE: CHEMICAL PEEL GLYCOLIC ACID
Price (Use Numbers Only, No Special Characters Or $): Unique
Detail Level: Zone
Treatment Number: 0
Number Of Passes (Layers): 2
Prep: The treated area was degreased with pre-peel cleanser, and vaseline was applied for protection of mucous membranes.
Consent: Prior to the procedure, written consent was obtained and risks were reviewed, including but not limited to: redness, peeling, blistering, pigmentary change, scarring, infection, and pain.
Glycolic Acid %: 20%
Post Peel Care: The peel was neutralized with sodium bicarbonate. After the procedure, the treatment area was washed with soap and water, and a post-peel cream was applied. Sun protection and post-care instructions were reviewed with the patient.
Time (Mins): 4
Post-Care Instructions: I reviewed with the patient in detail post-care instructions. Patient should avoid sun exposure and wear sun protection.

## 2023-11-16 ENCOUNTER — APPOINTMENT (RX ONLY)
Dept: URBAN - METROPOLITAN AREA CLINIC 126 | Facility: CLINIC | Age: 73
Setting detail: DERMATOLOGY
End: 2023-11-16

## 2023-11-16 DIAGNOSIS — Z41.9 ENCOUNTER FOR PROCEDURE FOR PURPOSES OTHER THAN REMEDYING HEALTH STATE, UNSPECIFIED: ICD-10-CM

## 2023-11-16 PROCEDURE — ? FILLERS

## 2023-11-16 ASSESSMENT — LOCATION ZONE DERM: LOCATION ZONE: FACE

## 2023-11-16 ASSESSMENT — LOCATION DETAILED DESCRIPTION DERM
LOCATION DETAILED: LEFT INFERIOR CENTRAL MALAR CHEEK
LOCATION DETAILED: RIGHT INFERIOR MEDIAL MALAR CHEEK

## 2023-11-16 ASSESSMENT — LOCATION SIMPLE DESCRIPTION DERM
LOCATION SIMPLE: LEFT CHEEK
LOCATION SIMPLE: RIGHT CHEEK

## 2023-11-16 NOTE — PROCEDURE: FILLERS
Additional Area 5 Volume In Cc: 0
Map Statment: See [address] for Complete Details
Nasolabial Folds Filler Volume In Cc: 0.4
Consent: Written consent obtained. Risks include but not limited to bruising, beading, irregular texture, ulceration, infection, allergic reaction, scar formation, incomplete augmentation, temporary nature, procedural pain.
Post-Care Instructions: Patient instructed to apply ice to reduce swelling.
Marionette Lines Filler  Volume In Cc: 0.2
Anesthesia Type: 1% lidocaine with epinephrine
Include Cannula Information In Note?: No
Number Of Syringes (Required For Inventory): 1
Anesthesia Volume In Cc: 0.5
Additional Anesthesia Volume In Cc: 6
Inventory Information: This plan will send filler information to inventory based on the fillers you select. Multiple fillers can be sent but you must ensure you select the appropriate fillers in the inventory tab.
Additional Area 1 Location: upper lip lines
Detail Level: Detailed
Show Inventory Tab: Show
Additional Area 2 Location: oral commissures
Filler: Juvederm Ultra Plus XC

## 2023-12-07 ENCOUNTER — APPOINTMENT (RX ONLY)
Dept: URBAN - METROPOLITAN AREA CLINIC 126 | Facility: CLINIC | Age: 73
Setting detail: DERMATOLOGY
End: 2023-12-07

## 2023-12-07 DIAGNOSIS — Z41.9 ENCOUNTER FOR PROCEDURE FOR PURPOSES OTHER THAN REMEDYING HEALTH STATE, UNSPECIFIED: ICD-10-CM

## 2023-12-07 PROCEDURE — ? DERMAPLANE

## 2023-12-07 PROCEDURE — ? CHEMICAL PEEL

## 2023-12-07 PROCEDURE — ? SIGNATURE HYDRAFACIAL

## 2023-12-07 ASSESSMENT — LOCATION SIMPLE DESCRIPTION DERM
LOCATION SIMPLE: LEFT CHEEK
LOCATION SIMPLE: UPPER BACK

## 2023-12-07 ASSESSMENT — LOCATION ZONE DERM
LOCATION ZONE: FACE
LOCATION ZONE: TRUNK

## 2023-12-07 ASSESSMENT — LOCATION DETAILED DESCRIPTION DERM
LOCATION DETAILED: SUPERIOR THORACIC SPINE
LOCATION DETAILED: LEFT INFERIOR CENTRAL MALAR CHEEK

## 2023-12-07 NOTE — PROCEDURE: SIGNATURE HYDRAFACIAL
Vacuum Pressure Low Setting (Will Not Render If Set To 0): 0
Tip: Hydropeel Tip, Clear
Tip: Hydropeel Tip, Blue
Procedure: Extraction
Indication: skin texture
Solution Override
Procedure: Fusion
Procedure: Exfoliation
Consent: Written consent obtained, risks reviewed including but not limited to crusting, scabbing, blistering, scarring, darker or lighter pigmentary change, bruising, and/or incomplete response.
Tip Override
Post-Care Instructions: I reviewed with the patient in detail post-care instructions. Patient should stay away from the sun and wear sun protection until treated areas are fully healed.
Tip: Hydropeel Tip, Teal
Solution: Beta-HD
Location: face
Procedure: Extend and Protect
Solution: Activ-4
Procedure: Peel
Price (Use Numbers Only, No Special Characters Or $): 175
Solution: GlySal 7.5%
Procedure: Boost
Treatment Number: 1

## 2023-12-07 NOTE — PROCEDURE: DERMAPLANE
Treatment Areas: face
Post-Procedure Instructions: Following the dermaplane procedure, Oxymist treatment was applied to the treatment areas. Moisturizer and SPF was applied.
Price (Use Numbers Only, No Special Characters Or $): 60
Blade: [place] scalpel
Treatment Area Prep: alcohol
Post-Care Instructions: I reviewed with the patient in detail post-care instructions.
Detail Level: Zone
Pre-Procedure Text: The patient was placed in a recumbant position on the procedure table.

## 2023-12-07 NOTE — PROCEDURE: CHEMICAL PEEL
Number Of Layers: 3
Consent: Prior to the procedure, written consent was obtained and risks were reviewed, including but not limited to: redness, peeling, blistering, pigmentary change, scarring, infection, and pain.
Chemical Peel: Micropeel Plus 20 Solution
Price (Use Numbers Only, No Special Characters Or $): 125
Post Peel Care: . Sun protection and post-care instructions were reviewed with the patient.
Treatment Time (Optional): 3 min
Treatment Number: 0
Detail Level: Zone
Post-Care Instructions: I reviewed with the patient in detail post-care instructions. Patient should avoid sun exposure and wear sun protection.
Prep: The treated area was degreased with pre-peel cleanser, and vaseline was applied for protection of mucous membranes.

## 2024-01-04 ENCOUNTER — APPOINTMENT (RX ONLY)
Dept: URBAN - METROPOLITAN AREA CLINIC 126 | Facility: CLINIC | Age: 74
Setting detail: DERMATOLOGY
End: 2024-01-04

## 2024-01-04 DIAGNOSIS — Z41.9 ENCOUNTER FOR PROCEDURE FOR PURPOSES OTHER THAN REMEDYING HEALTH STATE, UNSPECIFIED: ICD-10-CM

## 2024-01-04 PROCEDURE — ? SIGNATURE HYDRAFACIAL

## 2024-01-04 PROCEDURE — ? CHEMICAL PEEL

## 2024-01-04 PROCEDURE — ? DERMAPLANE

## 2024-01-04 ASSESSMENT — LOCATION SIMPLE DESCRIPTION DERM
LOCATION SIMPLE: RIGHT CHEEK
LOCATION SIMPLE: RIGHT UPPER BACK
LOCATION SIMPLE: LEFT CHEEK

## 2024-01-04 ASSESSMENT — LOCATION DETAILED DESCRIPTION DERM
LOCATION DETAILED: RIGHT INFERIOR CENTRAL MALAR CHEEK
LOCATION DETAILED: LEFT INFERIOR CENTRAL MALAR CHEEK
LOCATION DETAILED: RIGHT SUPERIOR MEDIAL UPPER BACK

## 2024-01-04 ASSESSMENT — LOCATION ZONE DERM
LOCATION ZONE: TRUNK
LOCATION ZONE: FACE

## 2024-01-04 NOTE — PROCEDURE: DERMAPLANE
Treatment Area Prep: alcohol
Post-Care Instructions: I reviewed with the patient in detail post-care instructions.
Post-Procedure Instructions: Following the dermaplane procedure, Oxymist treatment was applied to the treatment areas. Moisturizer and SPF was applied.
Treatment Areas: face
Detail Level: Zone
Pre-Procedure Text: The patient was placed in a recumbant position on the procedure table.
Price (Use Numbers Only, No Special Characters Or $): 60
Blade: [place] scalpel

## 2024-01-04 NOTE — PROCEDURE: SIGNATURE HYDRAFACIAL
Vacuum Pressure High Setting (Will Not Render If Set To 0): 0
Consent: Written consent obtained, risks reviewed including but not limited to crusting, scabbing, blistering, scarring, darker or lighter pigmentary change, bruising, and/or incomplete response.
Tip: Hydropeel Tip, Teal
Solution: Activ-4
Location: face
Post-Care Instructions: I reviewed with the patient in detail post-care instructions. Patient should stay away from the sun and wear sun protection until treated areas are fully healed.
Tip: Hydropeel Tip, Clear
Procedure: Exfoliation
Tip Override
Indication: skin texture
Procedure: Extraction
Procedure: Peel
Procedure: Boost
Price (Use Numbers Only, No Special Characters Or $): 175
Procedure: Extend and Protect
Procedure: Fusion
Treatment Number: 1
Solution: Beta-HD
Tip: Hydropeel Tip, Blue
Solution: GlySal 7.5%
Solution Override

## 2024-01-04 NOTE — PROCEDURE: CHEMICAL PEEL
Post Peel Care: . Sun protection and post-care instructions were reviewed with the patient.
Price (Use Numbers Only, No Special Characters Or $): 125
Treatment Time (Optional): 3 min
Detail Level: Zone
Treatment Number: 0
Number Of Layers: 3
Chemical Peel: Micropeel Plus 20 Solution
Prep: The treated area was degreased with pre-peel cleanser, and vaseline was applied for protection of mucous membranes.
Consent: Prior to the procedure, written consent was obtained and risks were reviewed, including but not limited to: redness, peeling, blistering, pigmentary change, scarring, infection, and pain.
Post-Care Instructions: I reviewed with the patient in detail post-care instructions. Patient should avoid sun exposure and wear sun protection.

## 2024-01-18 ENCOUNTER — APPOINTMENT (RX ONLY)
Dept: URBAN - METROPOLITAN AREA CLINIC 126 | Facility: CLINIC | Age: 74
Setting detail: DERMATOLOGY
End: 2024-01-18

## 2024-01-18 DIAGNOSIS — Z41.9 ENCOUNTER FOR PROCEDURE FOR PURPOSES OTHER THAN REMEDYING HEALTH STATE, UNSPECIFIED: ICD-10-CM

## 2024-01-18 PROCEDURE — ? SCITON MOXI

## 2024-01-18 PROCEDURE — ? SCITON BBL HERO

## 2024-01-18 ASSESSMENT — LOCATION ZONE DERM: LOCATION ZONE: FACE

## 2024-01-18 ASSESSMENT — LOCATION DETAILED DESCRIPTION DERM: LOCATION DETAILED: LEFT INFERIOR CENTRAL MALAR CHEEK

## 2024-01-18 ASSESSMENT — LOCATION SIMPLE DESCRIPTION DERM: LOCATION SIMPLE: LEFT CHEEK

## 2024-01-18 NOTE — PROCEDURE: SCITON MOXI
Pre=procedure Text: After consent was obtained, the treatment areas were cleaned and treated using the parameters noted above.
Anesthesia Type: 1% lidocaine with epinephrine
Laser Type: Fractionated 1927nm, thulium laser
Total Accumulated Energy (J): 1004
External Cooling Fan Speed: 5
Total Coverage (%): 20
Length Of Topical Anesthesia Application (Optional): 30 minutes
Topical Anesthesia?: BLT gel (benzocaine 20%, lidocaine 10%, tetracaine 10%)
Consent: Written consent obtained.  Risks were reviewed including but not limited to crusting, scabbing, blistering, scarring, darker or lighter pigmentary change, incomplete improvement, prolonged erythema and facial swelling, infection, and bleeding.
Detail Level: Detailed
% Per Pass: 3.3
Number Of Passes: 6
Energy (J/Cm2): 20.0
Level 3
Treatment Number: 1
Post-Care Instructions: I reviewed with the patient in detail post-care instructions.  Recommended diligent sun protection and sun avoidance.

## 2024-02-01 ENCOUNTER — APPOINTMENT (RX ONLY)
Dept: URBAN - METROPOLITAN AREA CLINIC 126 | Facility: CLINIC | Age: 74
Setting detail: DERMATOLOGY
End: 2024-02-01

## 2024-02-01 DIAGNOSIS — Z41.9 ENCOUNTER FOR PROCEDURE FOR PURPOSES OTHER THAN REMEDYING HEALTH STATE, UNSPECIFIED: ICD-10-CM

## 2024-02-01 PROCEDURE — ? DERMAPLANE

## 2024-02-01 PROCEDURE — ? CHEMICAL PEEL

## 2024-02-01 PROCEDURE — ? SIGNATURE HYDRAFACIAL

## 2024-02-01 ASSESSMENT — LOCATION SIMPLE DESCRIPTION DERM
LOCATION SIMPLE: UPPER BACK
LOCATION SIMPLE: LEFT CHEEK

## 2024-02-01 ASSESSMENT — LOCATION ZONE DERM
LOCATION ZONE: TRUNK
LOCATION ZONE: FACE

## 2024-02-01 ASSESSMENT — LOCATION DETAILED DESCRIPTION DERM
LOCATION DETAILED: SUPERIOR THORACIC SPINE
LOCATION DETAILED: LEFT INFERIOR CENTRAL MALAR CHEEK
LOCATION DETAILED: LEFT CENTRAL MALAR CHEEK

## 2024-02-01 NOTE — PROCEDURE: DERMAPLANE
Detail Level: Zone
Post-Procedure Instructions: Following the dermaplane procedure, Oxymist treatment was applied to the treatment areas. Moisturizer and SPF was applied.
Treatment Area Prep: alcohol
Pre-Procedure Text: The patient was placed in a recumbant position on the procedure table.
Price (Use Numbers Only, No Special Characters Or $): 60
Treatment Areas: face
Post-Care Instructions: I reviewed with the patient in detail post-care instructions.
Blade: [place] scalpel

## 2024-02-01 NOTE — PROCEDURE: CHEMICAL PEEL
Post-Care Instructions: I reviewed with the patient in detail post-care instructions. Patient should avoid sun exposure and wear sun protection.
Prep: The treated area was degreased with pre-peel cleanser, and vaseline was applied for protection of mucous membranes.
Consent: Prior to the procedure, written consent was obtained and risks were reviewed, including but not limited to: redness, peeling, blistering, pigmentary change, scarring, infection, and pain.
Treatment Number: 0
Number Of Layers: 3
Price (Use Numbers Only, No Special Characters Or $): 125
Chemical Peel: Micropeel 20 Solution
Treatment Time (Optional): 3 min
Post Peel Care: . Sun protection and post-care instructions were reviewed with the patient.
Detail Level: Zone

## 2024-02-01 NOTE — PROCEDURE: SIGNATURE HYDRAFACIAL
Number Of Passes: 0
Tip: Hydropeel Tip, Clear
Procedure: Peel
Solution Override
Tip: Hydropeel Tip, Teal
Procedure: Exfoliation
Indication: skin texture
Procedure: Extend and Protect
Tip: Hydropeel Tip, Blue
Price (Use Numbers Only, No Special Characters Or $): 175
Treatment Number: 1
Procedure: Fusion
Solution: Beta-HD
Post-Care Instructions: I reviewed with the patient in detail post-care instructions. Patient should stay away from the sun and wear sun protection until treated areas are fully healed.
Tip Override
Procedure: Boost
Solution: GlySal 7.5%
Consent: Written consent obtained, risks reviewed including but not limited to crusting, scabbing, blistering, scarring, darker or lighter pigmentary change, bruising, and/or incomplete response.
Procedure: Extraction
Solution: Activ-4
Location: face

## 2024-03-07 ENCOUNTER — APPOINTMENT (RX ONLY)
Dept: URBAN - METROPOLITAN AREA CLINIC 126 | Facility: CLINIC | Age: 74
Setting detail: DERMATOLOGY
End: 2024-03-07

## 2024-03-07 DIAGNOSIS — Z41.9 ENCOUNTER FOR PROCEDURE FOR PURPOSES OTHER THAN REMEDYING HEALTH STATE, UNSPECIFIED: ICD-10-CM

## 2024-03-07 PROCEDURE — ? CHEMICAL PEEL

## 2024-03-07 PROCEDURE — ? DERMAPLANE

## 2024-03-07 PROCEDURE — ? SIGNATURE HYDRAFACIAL

## 2024-03-07 ASSESSMENT — LOCATION ZONE DERM
LOCATION ZONE: TRUNK
LOCATION ZONE: FACE

## 2024-03-07 ASSESSMENT — LOCATION DETAILED DESCRIPTION DERM
LOCATION DETAILED: LEFT INFERIOR CENTRAL MALAR CHEEK
LOCATION DETAILED: RIGHT SUPERIOR MEDIAL UPPER BACK

## 2024-03-07 ASSESSMENT — LOCATION SIMPLE DESCRIPTION DERM
LOCATION SIMPLE: RIGHT UPPER BACK
LOCATION SIMPLE: LEFT CHEEK

## 2024-03-07 NOTE — PROCEDURE: SIGNATURE HYDRAFACIAL
Tip: Hydropeel Tip, Teal
Procedure: Fusion
Number Of Passes: 0
Tip: Hydropeel Tip, Clear
Indication: skin texture
Procedure: Exfoliation
Procedure: Extend and Protect
Procedure: Peel
Solution Override
Procedure: Extraction
Location: neck
Procedure: Boost
Solution: GlySal 7.5%
Solution: Activ-4
Solution: Beta-HD
Consent: Written consent obtained, risks reviewed including but not limited to crusting, scabbing, blistering, scarring, darker or lighter pigmentary change, bruising, and/or incomplete response.
Post-Care Instructions: I reviewed with the patient in detail post-care instructions. Patient should stay away from the sun and wear sun protection until treated areas are fully healed.
Tip Override
Treatment Number: 1
Tip: Hydropeel Tip, Blue
Price (Use Numbers Only, No Special Characters Or $): 175

## 2024-03-07 NOTE — PROCEDURE: DERMAPLANE
Treatment Area Prep: alcohol
Post-Care Instructions: I reviewed with the patient in detail post-care instructions.
Pre-Procedure Text: The patient was placed in a recumbant position on the procedure table.
Treatment Areas: face
Post-Procedure Instructions: Following the dermaplane procedure, Oxymist treatment was applied to the treatment areas. Moisturizer and SPF was applied.
Detail Level: Zone
Price (Use Numbers Only, No Special Characters Or $): 60
Blade: [place] scalpel

## 2024-03-07 NOTE — PROCEDURE: CHEMICAL PEEL
Consent: Prior to the procedure, written consent was obtained and risks were reviewed, including but not limited to: redness, peeling, blistering, pigmentary change, scarring, infection, and pain.
Prep: The treated area was degreased with pre-peel cleanser, and vaseline was applied for protection of mucous membranes.
Number Of Layers: 3
Treatment Number: 0
Treatment Time (Optional): 3 min
Chemical Peel: Micropeel 20 Solution
Detail Level: Zone
Price (Use Numbers Only, No Special Characters Or $): 125
Post Peel Care: . Sun protection and post-care instructions were reviewed with the patient.
Post-Care Instructions: I reviewed with the patient in detail post-care instructions. Patient should avoid sun exposure and wear sun protection.

## 2024-03-13 ENCOUNTER — APPOINTMENT (RX ONLY)
Dept: URBAN - METROPOLITAN AREA CLINIC 126 | Facility: CLINIC | Age: 74
Setting detail: DERMATOLOGY
End: 2024-03-13

## 2024-03-13 DIAGNOSIS — L08.9 LOCAL INFECTION OF THE SKIN AND SUBCUTANEOUS TISSUE, UNSPECIFIED: ICD-10-CM | Status: INADEQUATELY CONTROLLED

## 2024-03-13 DIAGNOSIS — Z71.89 OTHER SPECIFIED COUNSELING: ICD-10-CM

## 2024-03-13 DIAGNOSIS — R20.2 PARESTHESIA OF SKIN: ICD-10-CM | Status: INADEQUATELY CONTROLLED

## 2024-03-13 DIAGNOSIS — L81.4 OTHER MELANIN HYPERPIGMENTATION: ICD-10-CM

## 2024-03-13 DIAGNOSIS — L82.1 OTHER SEBORRHEIC KERATOSIS: ICD-10-CM

## 2024-03-13 DIAGNOSIS — L90.5 SCAR CONDITIONS AND FIBROSIS OF SKIN: ICD-10-CM

## 2024-03-13 DIAGNOSIS — Z87.2 PERSONAL HISTORY OF DISEASES OF THE SKIN AND SUBCUTANEOUS TISSUE: ICD-10-CM

## 2024-03-13 DIAGNOSIS — D18.0 HEMANGIOMA: ICD-10-CM

## 2024-03-13 PROBLEM — D18.01 HEMANGIOMA OF SKIN AND SUBCUTANEOUS TISSUE: Status: ACTIVE | Noted: 2024-03-13

## 2024-03-13 PROCEDURE — ? COUNSELING

## 2024-03-13 PROCEDURE — ? SUNSCREEN RECOMMENDATIONS

## 2024-03-13 PROCEDURE — 99214 OFFICE O/P EST MOD 30 MIN: CPT

## 2024-03-13 PROCEDURE — ? DIAGNOSIS COMMENT

## 2024-03-13 PROCEDURE — ? PRESCRIPTION

## 2024-03-13 RX ORDER — TRIAMCINOLONE ACETONIDE 1 MG/G
CREAM TOPICAL BID
Qty: 80 | Refills: 1 | Status: ERX | COMMUNITY
Start: 2024-03-13

## 2024-03-13 RX ADMIN — TRIAMCINOLONE ACETONIDE: 1 CREAM TOPICAL at 00:00

## 2024-03-13 ASSESSMENT — LOCATION SIMPLE DESCRIPTION DERM
LOCATION SIMPLE: RIGHT SHOULDER
LOCATION SIMPLE: LEFT LOWER BACK
LOCATION SIMPLE: CHEST
LOCATION SIMPLE: LEFT UPPER BACK
LOCATION SIMPLE: LEFT EYEBROW
LOCATION SIMPLE: RIGHT FOREARM
LOCATION SIMPLE: LEFT POSTERIOR THIGH
LOCATION SIMPLE: ABDOMEN
LOCATION SIMPLE: RIGHT POSTERIOR THIGH
LOCATION SIMPLE: RIGHT UPPER BACK
LOCATION SIMPLE: RIGHT LOWER BACK
LOCATION SIMPLE: LEFT UPPER ARM

## 2024-03-13 ASSESSMENT — LOCATION DETAILED DESCRIPTION DERM
LOCATION DETAILED: RIGHT DISTAL POSTERIOR THIGH
LOCATION DETAILED: RIGHT LATERAL SUPERIOR CHEST
LOCATION DETAILED: LEFT ANTERIOR PROXIMAL UPPER ARM
LOCATION DETAILED: RIGHT LATERAL ABDOMEN
LOCATION DETAILED: RIGHT VENTRAL PROXIMAL FOREARM
LOCATION DETAILED: LEFT DISTAL POSTERIOR THIGH
LOCATION DETAILED: LEFT CENTRAL EYEBROW
LOCATION DETAILED: RIGHT SUPERIOR UPPER BACK
LOCATION DETAILED: LEFT INFERIOR UPPER BACK
LOCATION DETAILED: LEFT INFERIOR LATERAL MIDBACK
LOCATION DETAILED: LEFT MEDIAL SUPERIOR CHEST
LOCATION DETAILED: RIGHT INFERIOR MEDIAL MIDBACK
LOCATION DETAILED: RIGHT ANTERIOR SHOULDER
LOCATION DETAILED: RIGHT MID-UPPER BACK
LOCATION DETAILED: RIGHT RIB CAGE
LOCATION DETAILED: LEFT SUPERIOR UPPER BACK
LOCATION DETAILED: LEFT INFERIOR LATERAL LOWER BACK

## 2024-03-13 ASSESSMENT — LOCATION ZONE DERM
LOCATION ZONE: ARM
LOCATION ZONE: FACE
LOCATION ZONE: LEG
LOCATION ZONE: TRUNK

## 2024-03-13 NOTE — PROCEDURE: DIAGNOSIS COMMENT
Render Risk Assessment In Note?: no
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
Detail Level: Simple
Comment: Redness on eyelid with eye drainage, advised to contact ophthalmologist for appointment asap

## 2024-04-04 ENCOUNTER — APPOINTMENT (RX ONLY)
Dept: URBAN - METROPOLITAN AREA CLINIC 126 | Facility: CLINIC | Age: 74
Setting detail: DERMATOLOGY
End: 2024-04-04

## 2024-04-04 DIAGNOSIS — Z41.9 ENCOUNTER FOR PROCEDURE FOR PURPOSES OTHER THAN REMEDYING HEALTH STATE, UNSPECIFIED: ICD-10-CM

## 2024-04-04 PROCEDURE — ? SIGNATURE HYDRAFACIAL

## 2024-04-04 PROCEDURE — ? CHEMICAL PEEL

## 2024-04-04 PROCEDURE — ? DERMAPLANE

## 2024-04-04 ASSESSMENT — LOCATION SIMPLE DESCRIPTION DERM
LOCATION SIMPLE: LEFT CHEEK
LOCATION SIMPLE: LEFT UPPER BACK

## 2024-04-04 ASSESSMENT — LOCATION DETAILED DESCRIPTION DERM
LOCATION DETAILED: LEFT INFERIOR MEDIAL MALAR CHEEK
LOCATION DETAILED: LEFT INFERIOR CENTRAL MALAR CHEEK
LOCATION DETAILED: LEFT SUPERIOR MEDIAL UPPER BACK

## 2024-04-04 ASSESSMENT — LOCATION ZONE DERM
LOCATION ZONE: FACE
LOCATION ZONE: TRUNK

## 2024-04-04 NOTE — PROCEDURE: CHEMICAL PEEL
Detail Level: Zone
Prep: The treated area was degreased with pre-peel cleanser, and vaseline was applied for protection of mucous membranes.
Consent: Prior to the procedure, written consent was obtained and risks were reviewed, including but not limited to: redness, peeling, blistering, pigmentary change, scarring, infection, and pain.
Treatment Number: 0
Number Of Layers: 3
Chemical Peel: Micropeel 20 Solution
Post Peel Care: . Sun protection and post-care instructions were reviewed with the patient.
Price (Use Numbers Only, No Special Characters Or $): 125
Treatment Time (Optional): 3 min
Post-Care Instructions: I reviewed with the patient in detail post-care instructions. Patient should avoid sun exposure and wear sun protection.

## 2024-04-04 NOTE — PROCEDURE: DERMAPLANE
Treatment Areas: face
Pre-Procedure Text: The patient was placed in a recumbant position on the procedure table.
Price (Use Numbers Only, No Special Characters Or $): 60
Treatment Area Prep: alcohol
Post-Procedure Instructions: Following the dermaplane procedure, Oxymist treatment was applied to the treatment areas. Moisturizer and SPF was applied.
Detail Level: Zone
Post-Care Instructions: I reviewed with the patient in detail post-care instructions.
Blade: [place] scalpel

## 2024-04-04 NOTE — PROCEDURE: SIGNATURE HYDRAFACIAL
Procedure: Extend and Protect
Location: neck
Procedure: Fusion
Solution: Activ-4
Procedure: Peel
Procedure: Boost
Procedure: Extraction
Number Of Passes: 0
Tip: Hydropeel Tip, Clear
Tip Override
Solution: GlySal 7.5%
Treatment Number: 1
Post-Care Instructions: I reviewed with the patient in detail post-care instructions. Patient should stay away from the sun and wear sun protection until treated areas are fully healed.
Tip: Hydropeel Tip, Teal
Price (Use Numbers Only, No Special Characters Or $): 175
Consent: Written consent obtained, risks reviewed including but not limited to crusting, scabbing, blistering, scarring, darker or lighter pigmentary change, bruising, and/or incomplete response.
Indication: skin texture
Tip: Hydropeel Tip, Blue
Solution Override
Procedure: Exfoliation
Solution: Beta-HD

## 2024-05-02 ENCOUNTER — APPOINTMENT (RX ONLY)
Dept: URBAN - METROPOLITAN AREA CLINIC 126 | Facility: CLINIC | Age: 74
Setting detail: DERMATOLOGY
End: 2024-05-02

## 2024-05-02 DIAGNOSIS — Z41.9 ENCOUNTER FOR PROCEDURE FOR PURPOSES OTHER THAN REMEDYING HEALTH STATE, UNSPECIFIED: ICD-10-CM

## 2024-05-02 PROCEDURE — ? SIGNATURE HYDRAFACIAL

## 2024-05-02 PROCEDURE — ? CHEMICAL PEEL

## 2024-05-02 PROCEDURE — ? DERMAPLANE

## 2024-05-02 ASSESSMENT — LOCATION SIMPLE DESCRIPTION DERM
LOCATION SIMPLE: LEFT CHEEK
LOCATION SIMPLE: UPPER BACK

## 2024-05-02 ASSESSMENT — LOCATION DETAILED DESCRIPTION DERM
LOCATION DETAILED: LEFT INFERIOR CENTRAL MALAR CHEEK
LOCATION DETAILED: SUPERIOR THORACIC SPINE

## 2024-05-02 ASSESSMENT — LOCATION ZONE DERM
LOCATION ZONE: TRUNK
LOCATION ZONE: FACE

## 2024-05-02 NOTE — PROCEDURE: SIGNATURE HYDRAFACIAL
Location: chest
Vacuum Pressure Low Setting (Will Not Render If Set To 0): 0
Solution: Beta-HD
Solution: Activ-4
Solution Override
Solution: GlySal 7.5%
Tip: Hydropeel Tip, Teal
Treatment Number: 1
Tip: Hydropeel Tip, Clear
Tip: Hydropeel Tip, Blue
Consent: Written consent obtained, risks reviewed including but not limited to crusting, scabbing, blistering, scarring, darker or lighter pigmentary change, bruising, and/or incomplete response.
Tip Override
Price (Use Numbers Only, No Special Characters Or $): 175
Post-Care Instructions: I reviewed with the patient in detail post-care instructions. Patient should stay away from the sun and wear sun protection until treated areas are fully healed.
Procedure: Peel
Indication: skin texture
Procedure: Exfoliation
Procedure: Extraction
Procedure: Extend and Protect
Procedure: Boost
Procedure: Fusion

## 2024-05-02 NOTE — PROCEDURE: DERMAPLANE
Detail Level: Zone
Blade: [place] scalpel
Pre-Procedure Text: The patient was placed in a recumbant position on the procedure table.
Price (Use Numbers Only, No Special Characters Or $): 60
Treatment Area Prep: alcohol
Post-Procedure Instructions: Following the dermaplane procedure, Oxymist treatment was applied to the treatment areas. Moisturizer and SPF was applied.
Treatment Areas: face
Post-Care Instructions: I reviewed with the patient in detail post-care instructions.

## 2024-05-02 NOTE — PROCEDURE: CHEMICAL PEEL
Number Of Layers: 3
Treatment Number: 0
Prep: The treated area was degreased with pre-peel cleanser, and vaseline was applied for protection of mucous membranes.
Consent: Prior to the procedure, written consent was obtained and risks were reviewed, including but not limited to: redness, peeling, blistering, pigmentary change, scarring, infection, and pain.
Price (Use Numbers Only, No Special Characters Or $): 125
Chemical Peel: Skinceuticals Micropeel Sensitive Skin Solution
Detail Level: Zone
Post Peel Care: . Sun protection and post-care instructions were reviewed with the patient.
Post-Care Instructions: I reviewed with the patient in detail post-care instructions. Patient should avoid sun exposure and wear sun protection.
Treatment Time (Optional): 3 min

## 2024-10-02 LAB
ALBUMIN: 4.6 G/DL
ALP BLD-CCNC: 64 U/L
ALT SERPL-CCNC: 16 U/L
ANION GAP SERPL CALCULATED.3IONS-SCNC: NORMAL MMOL/L
AST SERPL-CCNC: 8 U/L
BILIRUB SERPL-MCNC: 0.6 MG/DL (ref 0.1–1.4)
BUN BLDV-MCNC: 22 MG/DL
CALCIUM SERPL-MCNC: 9.8 MG/DL
CHLORIDE BLD-SCNC: 100 MMOL/L
CHOLESTEROL, TOTAL: 181 MG/DL
CHOLESTEROL/HDL RATIO: ABNORMAL
CO2: 25 MMOL/L
CREAT SERPL-MCNC: 22 MG/DL
ESTIMATED AVERAGE GLUCOSE: NORMAL
GFR, ESTIMATED: 95
GLUCOSE BLD-MCNC: 88 MG/DL
HBA1C MFR BLD: 5.6 %
HDLC SERPL-MCNC: 74 MG/DL (ref 35–70)
LDL CHOLESTEROL: 94
NONHDLC SERPL-MCNC: ABNORMAL MG/DL
POTASSIUM SERPL-SCNC: 4 MMOL/L
SODIUM BLD-SCNC: 137 MMOL/L
TOTAL PROTEIN: 7.1 G/DL (ref 6.4–8.2)
TRIGL SERPL-MCNC: 67 MG/DL
VLDLC SERPL CALC-MCNC: 13 MG/DL

## 2024-10-04 ENCOUNTER — TELEPHONE (OUTPATIENT)
Dept: CARDIOLOGY CLINIC | Age: 74
End: 2024-10-04

## 2024-10-04 NOTE — TELEPHONE ENCOUNTER
NM Stress test with myocardial perfusion imaging   Referral by : Nadege Lundy   DX: Abnormal EKG    Left a vm for pt to call back and to have test scheduled.

## 2024-10-07 DIAGNOSIS — R94.31 ABNORMAL ELECTROCARDIOGRAPHY: Primary | ICD-10-CM

## 2024-10-07 NOTE — PROGRESS NOTES
Cardiolite Treadmill NucMed.stress test order placed in [facility] per Dr.Tierni Lundy's referral order.

## 2024-10-08 DIAGNOSIS — R94.31 ABNORMAL ELECTROCARDIOGRAPHY: Primary | ICD-10-CM

## 2024-10-09 ENCOUNTER — TELEPHONE (OUTPATIENT)
Dept: CARDIOLOGY CLINIC | Age: 74
End: 2024-10-09

## 2024-10-09 NOTE — TELEPHONE ENCOUNTER
Next OV made per physician request on  10/15/2024 at 1600.   Made aware of abnormal results and picked her earliest OV date/time that worked for her.         Nuclear lexiscan stress test with myocardial perfusion     Image quality is good.    Stress Test: A pharmacological stress test was performed using regadenoson (Lexiscan). Hemodynamics are adequate for diagnosis. Blood pressure demonstrated a normal response and heart rate demonstrated a normal response to stress. The patient's heart rate recovery was normal.    Pt had LBBB rate related,    Septum has reduced uptake, both stress and rest,  has normal uptake , normal ef, 60%    The reduced uptake in the septum needs to be further investigated, OV to discuss results, patient will benefit from invasive workup

## 2024-10-15 ENCOUNTER — OFFICE VISIT (OUTPATIENT)
Dept: CARDIOLOGY CLINIC | Age: 74
End: 2024-10-15
Payer: MEDICARE

## 2024-10-15 VITALS
SYSTOLIC BLOOD PRESSURE: 122 MMHG | WEIGHT: 155 LBS | OXYGEN SATURATION: 97 % | DIASTOLIC BLOOD PRESSURE: 66 MMHG | HEART RATE: 78 BPM | BODY MASS INDEX: 28.52 KG/M2 | HEIGHT: 62 IN

## 2024-10-15 DIAGNOSIS — R94.39 ABNORMAL STRESS TEST: Primary | ICD-10-CM

## 2024-10-15 PROCEDURE — 1036F TOBACCO NON-USER: CPT | Performed by: INTERNAL MEDICINE

## 2024-10-15 PROCEDURE — G8484 FLU IMMUNIZE NO ADMIN: HCPCS | Performed by: INTERNAL MEDICINE

## 2024-10-15 PROCEDURE — G8427 DOCREV CUR MEDS BY ELIG CLIN: HCPCS | Performed by: INTERNAL MEDICINE

## 2024-10-15 PROCEDURE — G8400 PT W/DXA NO RESULTS DOC: HCPCS | Performed by: INTERNAL MEDICINE

## 2024-10-15 PROCEDURE — 1090F PRES/ABSN URINE INCON ASSESS: CPT | Performed by: INTERNAL MEDICINE

## 2024-10-15 PROCEDURE — 3017F COLORECTAL CA SCREEN DOC REV: CPT | Performed by: INTERNAL MEDICINE

## 2024-10-15 PROCEDURE — 1123F ACP DISCUSS/DSCN MKR DOCD: CPT | Performed by: INTERNAL MEDICINE

## 2024-10-15 PROCEDURE — 99204 OFFICE O/P NEW MOD 45 MIN: CPT | Performed by: INTERNAL MEDICINE

## 2024-10-15 PROCEDURE — G8419 CALC BMI OUT NRM PARAM NOF/U: HCPCS | Performed by: INTERNAL MEDICINE

## 2024-10-15 RX ORDER — LOSARTAN POTASSIUM 100 MG/1
100 TABLET ORAL DAILY
COMMUNITY

## 2024-10-15 RX ORDER — TIRZEPATIDE 2.5 MG/.5ML
INJECTION, SOLUTION SUBCUTANEOUS
COMMUNITY

## 2024-10-15 NOTE — ADDENDUM NOTE
Addended by: SHANNON CHRISTIANSON on: 10/15/2024 04:18 PM     Modules accepted: Orders     previously intubated - no problems

## 2024-10-15 NOTE — PROGRESS NOTES
recognition software and may contain errors related to that system including errors in grammar, punctuation, and spelling, as well as words and phrases that may be inappropriate. If there are any questions or concerns please feel free to contact the dictating provider for clarification.

## 2024-10-16 ENCOUNTER — TELEPHONE (OUTPATIENT)
Dept: CARDIOLOGY CLINIC | Age: 74
End: 2024-10-16

## 2024-10-16 DIAGNOSIS — R94.39 ABNORMAL STRESS TEST: Primary | ICD-10-CM

## 2024-10-16 RX ORDER — METOPROLOL TARTRATE 100 MG/1
TABLET ORAL
Qty: 2 TABLET | Refills: 0 | Status: SHIPPED | OUTPATIENT
Start: 2024-10-16 | End: 2024-10-17 | Stop reason: SDUPTHER

## 2024-10-16 NOTE — TELEPHONE ENCOUNTER
LM to patient to advise of Cardiac CTA appointment scheduled at [facility] on 10/22/24, with an arrival time of 9:30 am and an appointment time of 10:00 am.  Patient should be advised to fast eight hours prior and to bring an insurance card and photo id.       If patient should need to reschedule, please call Central Scheduling at # [phone].            Cardiac CTA Protocol    ?   Instructions for Cardiac CTA    Patient needs to NPO for 8 hours prior to the testing   If Patient is on Metformin: Hold 48 Hours prior to the Cardiac CTA.   Patient needs to have a recent BUN & CREAT (within 30 days) with no history of Renal concerns. If they have renal problems will need a BUN & CREAT within 2 weeks of the Cardiac CTA.    If the patient has an allergy to DYE, IODINE, and/or SHELLFISH order:    Prednisone (Medrol) 32mg- Take 1 tablet 12 hours prior and 1 tablet 2 hours prior to CTA    Benadryl 50mg- Take 50mg 2 hours prior to the procedure.    Encourage the patient to drink 1 Liter of water after the CTA to help flush the kidneys.    Continue all home medications including Beta blockers    Metoprolol Tartrate- Check the last BP and HR done at the last visit to follow instructions below.    ?   Heart rate more than 65 and systolic BP greater than 100   Give Metoprolol 100 mg 12 hours and 2 hours prior to the scan.       Heart Rate between 60-65 and systolic BP Greater than 100   Give Metoprolol 50 mg 12 hours and 2 hours prior to the scan.          Heart rate less than 60 there is NO additional Beta Blockers needed.    ?   ?   Contact Dr. Noriega for additional instructions with the following:    If the Systolic BP is NOT greater than 100   If the patient is allergic to Metoprolol or to beta blockers.        ?   Process for our office on Cardiac CTA    Doctors place the orders    Cardiac CTA is scheduled by    Maria Luz ABREU will send a telephone note to the MA assigned to that doctor to call the patient and give the patient 
Patient called back for Emigdio for CTA instructions call transfered  
medication instructions.    ?   **This does not apply to Dr. Armendariz's orders or CTA Calcium Scoring**

## 2024-10-17 ENCOUNTER — HOSPITAL ENCOUNTER (OUTPATIENT)
Age: 74
Discharge: HOME | End: 2024-10-17
Payer: MEDICARE

## 2024-10-17 ENCOUNTER — TELEPHONE (OUTPATIENT)
Dept: CARDIOLOGY CLINIC | Age: 74
End: 2024-10-17

## 2024-10-17 DIAGNOSIS — R94.39 ABNORMAL STRESS TEST: ICD-10-CM

## 2024-10-17 LAB
BUN SERPL-MCNC: 17 MG/DL (ref 7–20)
CREAT SERPL-MCNC: 0.6 MG/DL (ref 0.6–1.2)
GFR, ESTIMATED: >90 ML/MIN/1.73M2

## 2024-10-17 PROCEDURE — 84520 ASSAY OF UREA NITROGEN: CPT

## 2024-10-17 PROCEDURE — 82565 ASSAY OF CREATININE: CPT

## 2024-10-17 PROCEDURE — 36415 COLL VENOUS BLD VENIPUNCTURE: CPT

## 2024-10-17 RX ORDER — METOPROLOL TARTRATE 100 MG
TABLET ORAL
Qty: 2 TABLET | Refills: 0 | Status: SHIPPED | OUTPATIENT
Start: 2024-10-17

## 2024-10-17 NOTE — TELEPHONE ENCOUNTER
Patient called returning Emigdio's   Call to  her script for   Metoprolol at the office , she  Asking why it wasn't called   To her pharmacy she was just here   Today for labs please advise.

## 2024-10-22 ENCOUNTER — HOSPITAL ENCOUNTER (OUTPATIENT)
Dept: CT IMAGING | Age: 74
Discharge: HOME | End: 2024-10-22
Attending: INTERNAL MEDICINE
Payer: MEDICARE

## 2024-10-22 DIAGNOSIS — R94.39 ABNORMAL STRESS TEST: ICD-10-CM

## 2024-10-22 PROCEDURE — 75574 CT ANGIO HRT W/3D IMAGE: CPT

## 2024-10-22 PROCEDURE — 6360000004 HC RX CONTRAST MEDICATION: Performed by: INTERNAL MEDICINE

## 2024-10-22 PROCEDURE — 75574 CT ANGIO HRT W/3D IMAGE: CPT | Performed by: INTERNAL MEDICINE

## 2024-10-22 PROCEDURE — 3209999900 CT CARDIAC OVER READ

## 2024-10-22 RX ORDER — IOPAMIDOL 755 MG/ML
120 INJECTION, SOLUTION INTRAVASCULAR
Status: COMPLETED | OUTPATIENT
Start: 2024-10-22 | End: 2024-10-22

## 2024-10-22 RX ADMIN — IOPAMIDOL 120 ML: 755 INJECTION, SOLUTION INTRAVENOUS at 12:18

## 2024-10-22 NOTE — PROCEDURES
CARDIAC FINDINGS:  Cardiac CT scanning was performed, CT angiography during contrast administration using a 128 Scanner using a slice thickness of 0.625 mm and Gantry rotation time of 0.28 sec   Scanning was performed using 120 K  using auto adjustment of mA  Adjustment achieved low dose, retrospective gating was used  Contrast Type: isovue 370   Contrast used: 1cc/lb Max 120 cc   Dose length Product use:   Scan length: Excellent    Left Main Coronary artery:  Left main originates normally from the left coronary sinus and gives rise to the left anterior descending artery and left circumflex artery. It is free of any significant atherosclerotic disease.     Left anterior descending artery:  Left anterior descending artery is a normal caliber vessel that wraps around the apex and gives off 2 diagonal branches. The diagonal branches are free of any significant atherosclerotic disease. There is / no calcified plaque noted .     Left circumflex artery:  Left circumflex artery is a moderate size vessel that gives off a marginal branch. The left circumflex artery is free of any significant atherosclerotic disease.     Right coronary artery:  Right coronary artery originates from the right coronary sinus. It seems to be the dominant vessel and gives rise to the posterior descending artery and posterior ventricular artery. The RCA is moderate size vessel. . Posterior descending artery and posterior lateral branches are free of any significant disease.    Pericardium:  Pericardium is normal in appearance without any thickening, calcification or pericardial fluid.    Cardiac structures:   The ascending aorta is measured at 30.9 mm.    Interpretation summary:  There is no significant coronary artery disease identified.   Myocardium does not show any evidence of infarct or thickening.    NON-CARDIAC FINDINGS - []      Please note that only cardiac images and windows were evaluated by cardiology. Images reviewed for

## 2024-11-07 ENCOUNTER — APPOINTMENT (RX ONLY)
Dept: URBAN - METROPOLITAN AREA CLINIC 126 | Facility: CLINIC | Age: 74
Setting detail: DERMATOLOGY
End: 2024-11-07

## 2024-11-07 DIAGNOSIS — Z41.9 ENCOUNTER FOR PROCEDURE FOR PURPOSES OTHER THAN REMEDYING HEALTH STATE, UNSPECIFIED: ICD-10-CM

## 2024-11-07 PROCEDURE — ? DERMAPLANE

## 2024-11-07 PROCEDURE — ? CHEMICAL PEEL

## 2024-11-07 PROCEDURE — ? SIGNATURE HYDRAFACIAL

## 2024-11-07 ASSESSMENT — LOCATION ZONE DERM
LOCATION ZONE: FACE
LOCATION ZONE: TRUNK

## 2024-11-07 ASSESSMENT — LOCATION DETAILED DESCRIPTION DERM
LOCATION DETAILED: LEFT INFERIOR MEDIAL MALAR CHEEK
LOCATION DETAILED: LEFT SUPERIOR MEDIAL UPPER BACK
LOCATION DETAILED: RIGHT INFERIOR CENTRAL MALAR CHEEK

## 2024-11-07 ASSESSMENT — LOCATION SIMPLE DESCRIPTION DERM
LOCATION SIMPLE: LEFT CHEEK
LOCATION SIMPLE: RIGHT CHEEK
LOCATION SIMPLE: LEFT UPPER BACK

## 2024-11-07 NOTE — PROCEDURE: DERMAPLANE
Detail Level: Zone
Post-Procedure Instructions: Following the dermaplane procedure, Oxymist treatment was applied to the treatment areas. Moisturizer and SPF was applied.
Blade: [place] scalpel
Treatment Area Prep: alcohol
Price (Use Numbers Only, No Special Characters Or $): 60
Pre-Procedure Text: The patient was placed in a recumbant position on the procedure table.
Treatment Areas: face
Post-Care Instructions: I reviewed with the patient in detail post-care instructions.

## 2024-11-07 NOTE — PROCEDURE: CHEMICAL PEEL
Detail Level: Zone
Prep: The treated area was degreased with pre-peel cleanser, and vaseline was applied for protection of mucous membranes.
Chemical Peel: Micropeel 20 Solution
Treatment Number: 0
Consent: Prior to the procedure, written consent was obtained and risks were reviewed, including but not limited to: redness, peeling, blistering, pigmentary change, scarring, infection, and pain.
Number Of Layers: 3
Post-Care Instructions: I reviewed with the patient in detail post-care instructions. Patient should avoid sun exposure and wear sun protection.
Treatment Time (Optional): 3 min
Post Peel Care: . Sun protection and post-care instructions were reviewed with the patient.
Price (Use Numbers Only, No Special Characters Or $): 125

## 2024-11-07 NOTE — PROCEDURE: SIGNATURE HYDRAFACIAL
Solution Override
Vacuum Pressure High Setting (Will Not Render If Set To 0): 0
Post-Care Instructions: I reviewed with the patient in detail post-care instructions. Patient should stay away from the sun and wear sun protection until treated areas are fully healed.
Procedure: Extraction
Tip: Hydropeel Tip, Clear
Procedure: Boost
Treatment Number: 1
Price (Use Numbers Only, No Special Characters Or $): 175
Solution: Beta-HD
Indication: skin texture
Tip: Hydropeel Tip, Teal
Procedure: Peel
Tip: Hydropeel Tip, Blue
Tip Override
Procedure: Exfoliation
Procedure: Fusion
Location: face
Solution: Activ-4
Procedure: Extend and Protect
Solution: GlySal 7.5%
Consent: Written consent obtained, risks reviewed including but not limited to crusting, scabbing, blistering, scarring, darker or lighter pigmentary change, bruising, and/or incomplete response.

## 2024-12-05 ENCOUNTER — APPOINTMENT (OUTPATIENT)
Dept: URBAN - METROPOLITAN AREA CLINIC 126 | Facility: CLINIC | Age: 74
Setting detail: DERMATOLOGY
End: 2024-12-05

## 2024-12-05 DIAGNOSIS — Z41.9 ENCOUNTER FOR PROCEDURE FOR PURPOSES OTHER THAN REMEDYING HEALTH STATE, UNSPECIFIED: ICD-10-CM

## 2024-12-05 PROCEDURE — ? SIGNATURE HYDRAFACIAL

## 2024-12-05 PROCEDURE — ? CHEMICAL PEEL

## 2024-12-05 PROCEDURE — ? DERMAPLANE

## 2024-12-05 ASSESSMENT — LOCATION SIMPLE DESCRIPTION DERM
LOCATION SIMPLE: RIGHT CHEEK
LOCATION SIMPLE: LEFT CHEEK

## 2024-12-05 ASSESSMENT — LOCATION DETAILED DESCRIPTION DERM
LOCATION DETAILED: RIGHT INFERIOR MEDIAL MALAR CHEEK
LOCATION DETAILED: RIGHT INFERIOR CENTRAL MALAR CHEEK
LOCATION DETAILED: LEFT INFERIOR CENTRAL MALAR CHEEK

## 2024-12-05 ASSESSMENT — LOCATION ZONE DERM: LOCATION ZONE: FACE

## 2024-12-05 NOTE — PROCEDURE: DERMAPLANE
Treatment Area Prep: alcohol
Pre-Procedure Text: The patient was placed in a recumbant position on the procedure table.
Post-Care Instructions: I reviewed with the patient in detail post-care instructions.
Treatment Areas: face
Price (Use Numbers Only, No Special Characters Or $): 60
Post-Procedure Instructions: Following the dermaplane procedure, Oxymist treatment was applied to the treatment areas. Moisturizer and SPF was applied.
Detail Level: Zone
Blade: [place] scalpel

## 2024-12-05 NOTE — PROCEDURE: SIGNATURE HYDRAFACIAL
Number Of Passes: 0
Procedure: Exfoliation
Procedure: Extend and Protect
Solution Override
Solution: Beta-HD
Indication: skin texture
Tip: Hydropeel Tip, Blue
Tip Override
Treatment Number: 1
Procedure: Fusion
Solution: GlySal 7.5%
Procedure: Extraction
Price (Use Numbers Only, No Special Characters Or $): 175
Tip: Hydropeel Tip, Clear
Post-Care Instructions: I reviewed with the patient in detail post-care instructions. Patient should stay away from the sun and wear sun protection until treated areas are fully healed.
Tip: Hydropeel Tip, Teal
Procedure: Boost
Procedure: Peel
Consent: Written consent obtained, risks reviewed including but not limited to crusting, scabbing, blistering, scarring, darker or lighter pigmentary change, bruising, and/or incomplete response.
Solution: Activ-4
Location: face

## 2024-12-05 NOTE — PROCEDURE: CHEMICAL PEEL
Number Of Layers: 3
Post-Care Instructions: I reviewed with the patient in detail post-care instructions. Patient should avoid sun exposure and wear sun protection.
Treatment Time (Optional): 3 min
Price (Use Numbers Only, No Special Characters Or $): 125
Prep: The treated area was degreased with pre-peel cleanser, and vaseline was applied for protection of mucous membranes.
Post Peel Care: . Sun protection and post-care instructions were reviewed with the patient.
Treatment Number: 0
Consent: Prior to the procedure, written consent was obtained and risks were reviewed, including but not limited to: redness, peeling, blistering, pigmentary change, scarring, infection, and pain.
Detail Level: Zone
Chemical Peel: Micropeel 20 Solution

## 2025-01-02 ENCOUNTER — APPOINTMENT (OUTPATIENT)
Dept: URBAN - METROPOLITAN AREA CLINIC 126 | Facility: CLINIC | Age: 75
Setting detail: DERMATOLOGY
End: 2025-01-02

## 2025-01-02 DIAGNOSIS — Z41.9 ENCOUNTER FOR PROCEDURE FOR PURPOSES OTHER THAN REMEDYING HEALTH STATE, UNSPECIFIED: ICD-10-CM

## 2025-01-02 PROCEDURE — ? DERMAPLANE

## 2025-01-02 PROCEDURE — ? SIGNATURE HYDRAFACIAL

## 2025-01-02 PROCEDURE — ? CHEMICAL PEEL

## 2025-01-02 ASSESSMENT — LOCATION SIMPLE DESCRIPTION DERM: LOCATION SIMPLE: LEFT CHEEK

## 2025-01-02 ASSESSMENT — LOCATION DETAILED DESCRIPTION DERM
LOCATION DETAILED: LEFT INFERIOR CENTRAL MALAR CHEEK
LOCATION DETAILED: LEFT CENTRAL MALAR CHEEK

## 2025-01-02 ASSESSMENT — LOCATION ZONE DERM: LOCATION ZONE: FACE

## 2025-01-02 NOTE — PROCEDURE: SIGNATURE HYDRAFACIAL
Vacuum Pressure High Setting (Will Not Render If Set To 0): 0
Consent: Written consent obtained, risks reviewed including but not limited to crusting, scabbing, blistering, scarring, darker or lighter pigmentary change, bruising, and/or incomplete response.
Procedure: Exfoliation
Tip: Hydropeel Tip, Teal
Tip Override
Post-Care Instructions: I reviewed with the patient in detail post-care instructions. Patient should stay away from the sun and wear sun protection until treated areas are fully healed.
Tip: Hydropeel Tip, Blue
Solution Override
Solution: GlySal 7.5%
Tip: Hydropeel Tip, Clear
Procedure: Extraction
Procedure: Boost
Procedure: Extend and Protect
Price (Use Numbers Only, No Special Characters Or $): 175
Indication: skin texture
Solution: Activ-4
Solution: Beta-HD
Treatment Number: 1
Procedure: Peel
Procedure: Fusion
Location: face

## 2025-01-02 NOTE — PROCEDURE: CHEMICAL PEEL
Chemical Peel: Micropeel 20 Solution
Prep: The treated area was degreased with pre-peel cleanser, and vaseline was applied for protection of mucous membranes.
Post Peel Care: . Sun protection and post-care instructions were reviewed with the patient.
Treatment Number: 0
Price (Use Numbers Only, No Special Characters Or $): 125
Detail Level: Zone
Number Of Layers: 3
Treatment Time (Optional): 3 min
Consent: Prior to the procedure, written consent was obtained and risks were reviewed, including but not limited to: redness, peeling, blistering, pigmentary change, scarring, infection, and pain.
Post-Care Instructions: I reviewed with the patient in detail post-care instructions. Patient should avoid sun exposure and wear sun protection.

## 2025-01-02 NOTE — PROCEDURE: DERMAPLANE
Post-Procedure Instructions: Following the dermaplane procedure, Oxymist treatment was applied to the treatment areas. Moisturizer and SPF was applied.
Post-Care Instructions: I reviewed with the patient in detail post-care instructions.
Pre-Procedure Text: The patient was placed in a recumbant position on the procedure table.
Treatment Area Prep: alcohol
Detail Level: Zone
Blade: [place] scalpel
Price (Use Numbers Only, No Special Characters Or $): 60
Treatment Areas: face

## 2025-01-16 ENCOUNTER — APPOINTMENT (OUTPATIENT)
Dept: URBAN - METROPOLITAN AREA CLINIC 126 | Facility: CLINIC | Age: 75
Setting detail: DERMATOLOGY
End: 2025-01-16

## 2025-01-16 DIAGNOSIS — Z41.9 ENCOUNTER FOR PROCEDURE FOR PURPOSES OTHER THAN REMEDYING HEALTH STATE, UNSPECIFIED: ICD-10-CM

## 2025-01-16 PROCEDURE — ? SCITON MOXI

## 2025-01-16 PROCEDURE — ? SCITON BBL HERO

## 2025-01-16 NOTE — PROCEDURE: SCITON MOXI
External Cooling Fan Speed: 5
Detail Level: Simple
% Per Pass: 3.3
Consent: Written consent obtained.  Risks were reviewed including but not limited to crusting, scabbing, blistering, scarring, darker or lighter pigmentary change, incomplete improvement, prolonged erythema and facial swelling, infection, and bleeding.
Topical Anesthesia?: 20% benzocaine, 10% lidocaine, 10% tetracaine
Number Of Passes: 6
Level 3
Energy (J/Cm2): 20
Post-Care Instructions: I reviewed with the patient in detail post-care instructions.  Recommended diligent sun protection and sun avoidance.
Treatment Number: 3
Laser Type: Fractionated 1927nm, thulium laser
Total Accumulated Energy (J): 654
Pre=procedure Text: After consent was obtained, the treatment areas were cleaned and treated using the parameters noted above.
Anesthesia Type: 1% lidocaine with epinephrine

## 2025-01-16 NOTE — PROCEDURE: SCITON BBL HERO
Rate (Hz): 4.0
Spot Size: Finesse Adapter Size: 15 x 15 mm square
Consent: Written consent obtained.  Risks reviewed including but not limited to crusting, scabbing, blistering, scarring, darker or lighter pigmentary change, and incomplete clearance.
Fluence (J/Cm2) - Will Not Render If 0: 0
Pulse Width Units: milliseconds
Treatment Number: 3
Add Setting 3?: no
Filter: 515nm Filter
Anesthesia Type: 1% lidocaine with epinephrine
Total Pulses (Optional): 543
Procedure Note: After applying gel and applying protective eyeware, treatment was administered using the setting parameters listed above.
Spot Size: Finesse Adapter Size: 15 x 45 mm (No Finesse Adapter)
Post-Care Instructions: I reviewed with the patient in detail post-care instructions. Patient should avoid sun exposure before and after treatment.  Patient should wear sunscreen.
Detail Level: Simple
Fluence (J/Cm2) - Will Not Render If 0: 6
Temp (C): 25
Price (Use Numbers Only, No Special Characters Or $): 1129

## 2025-02-05 ENCOUNTER — APPOINTMENT (OUTPATIENT)
Dept: URBAN - METROPOLITAN AREA CLINIC 126 | Facility: CLINIC | Age: 75
Setting detail: DERMATOLOGY
End: 2025-02-05

## 2025-02-05 DIAGNOSIS — Z41.9 ENCOUNTER FOR PROCEDURE FOR PURPOSES OTHER THAN REMEDYING HEALTH STATE, UNSPECIFIED: ICD-10-CM

## 2025-02-05 PROCEDURE — ? SIGNATURE HYDRAFACIAL

## 2025-02-05 PROCEDURE — ? CHEMICAL PEEL

## 2025-02-05 PROCEDURE — ? DERMAPLANE

## 2025-02-05 ASSESSMENT — LOCATION DETAILED DESCRIPTION DERM
LOCATION DETAILED: RIGHT INFERIOR MEDIAL MALAR CHEEK
LOCATION DETAILED: LEFT SUPERIOR MEDIAL UPPER BACK
LOCATION DETAILED: LEFT INFERIOR CENTRAL MALAR CHEEK

## 2025-02-05 ASSESSMENT — LOCATION SIMPLE DESCRIPTION DERM
LOCATION SIMPLE: LEFT CHEEK
LOCATION SIMPLE: LEFT UPPER BACK
LOCATION SIMPLE: RIGHT CHEEK

## 2025-02-05 ASSESSMENT — LOCATION ZONE DERM
LOCATION ZONE: TRUNK
LOCATION ZONE: FACE

## 2025-02-05 NOTE — PROCEDURE: DERMAPLANE
Treatment Area Prep: alcohol
Post-Procedure Instructions: Following the dermaplane procedure, Oxymist treatment was applied to the treatment areas. Moisturizer and SPF was applied.
Blade: [place] scalpel
Post-Care Instructions: I reviewed with the patient in detail post-care instructions.
Detail Level: Zone
Price (Use Numbers Only, No Special Characters Or $): 60
Pre-Procedure Text: The patient was placed in a recumbant position on the procedure table.
Treatment Areas: face

## 2025-02-05 NOTE — PROCEDURE: SIGNATURE HYDRAFACIAL
Vacuum Pressure High Setting (Will Not Render If Set To 0): 0
Procedure: Extend and Protect
Solution: GlySal 7.5%
Procedure: Boost
Solution Override
Treatment Number: 1
Price (Use Numbers Only, No Special Characters Or $): 175
Tip: Hydropeel Tip, Blue
Solution: Beta-HD
Indication: skin texture
Consent: Written consent obtained, risks reviewed including but not limited to crusting, scabbing, blistering, scarring, darker or lighter pigmentary change, bruising, and/or incomplete response.
Post-Care Instructions: I reviewed with the patient in detail post-care instructions. Patient should stay away from the sun and wear sun protection until treated areas are fully healed.
Tip: Hydropeel Tip, Clear
Procedure: Exfoliation
Tip: Hydropeel Tip, Teal
Location: face
Tip Override
Procedure: Peel
Solution: Activ-4
Procedure: Fusion
Procedure: Extraction

## 2025-02-05 NOTE — PROCEDURE: CHEMICAL PEEL
Prep: The treated area was degreased with pre-peel cleanser, and vaseline was applied for protection of mucous membranes.
Treatment Time (Optional): 3 min
Chemical Peel: Micropeel 20 Solution
Price (Use Numbers Only, No Special Characters Or $): 125
Post-Care Instructions: I reviewed with the patient in detail post-care instructions. Patient should avoid sun exposure and wear sun protection.
Post Peel Care: . Sun protection and post-care instructions were reviewed with the patient.
Number Of Layers: 3
Detail Level: Zone
Consent: Prior to the procedure, written consent was obtained and risks were reviewed, including but not limited to: redness, peeling, blistering, pigmentary change, scarring, infection, and pain.
Treatment Number: 0

## 2025-03-14 ENCOUNTER — APPOINTMENT (OUTPATIENT)
Dept: URBAN - METROPOLITAN AREA CLINIC 126 | Facility: CLINIC | Age: 75
Setting detail: DERMATOLOGY
End: 2025-03-14

## 2025-03-14 DIAGNOSIS — L81.4 OTHER MELANIN HYPERPIGMENTATION: ICD-10-CM

## 2025-03-14 DIAGNOSIS — D18.0 HEMANGIOMA: ICD-10-CM

## 2025-03-14 DIAGNOSIS — Z71.89 OTHER SPECIFIED COUNSELING: ICD-10-CM

## 2025-03-14 DIAGNOSIS — L82.1 OTHER SEBORRHEIC KERATOSIS: ICD-10-CM

## 2025-03-14 DIAGNOSIS — L82.0 INFLAMED SEBORRHEIC KERATOSIS: ICD-10-CM

## 2025-03-14 DIAGNOSIS — L57.8 OTHER SKIN CHANGES DUE TO CHRONIC EXPOSURE TO NONIONIZING RADIATION: ICD-10-CM | Status: STABLE

## 2025-03-14 PROBLEM — D18.01 HEMANGIOMA OF SKIN AND SUBCUTANEOUS TISSUE: Status: ACTIVE | Noted: 2025-03-14

## 2025-03-14 PROCEDURE — ? COUNSELING

## 2025-03-14 PROCEDURE — ? LIQUID NITROGEN

## 2025-03-14 PROCEDURE — 17110 DESTRUCTION B9 LES UP TO 14: CPT

## 2025-03-14 PROCEDURE — ? SUNSCREEN RECOMMENDATIONS

## 2025-03-14 PROCEDURE — 99213 OFFICE O/P EST LOW 20 MIN: CPT | Mod: 25

## 2025-03-14 ASSESSMENT — LOCATION ZONE DERM
LOCATION ZONE: TRUNK
LOCATION ZONE: LEG
LOCATION ZONE: ARM
LOCATION ZONE: NECK

## 2025-03-14 ASSESSMENT — LOCATION DETAILED DESCRIPTION DERM
LOCATION DETAILED: RIGHT MID-UPPER BACK
LOCATION DETAILED: LEFT SUPERIOR UPPER BACK
LOCATION DETAILED: LEFT INFERIOR ANTERIOR NECK
LOCATION DETAILED: LEFT LATERAL ABDOMEN
LOCATION DETAILED: RIGHT LATERAL ABDOMEN
LOCATION DETAILED: RIGHT SUPERIOR UPPER BACK
LOCATION DETAILED: RIGHT LATERAL SUPERIOR CHEST
LOCATION DETAILED: RIGHT ANTERIOR SHOULDER
LOCATION DETAILED: LEFT INFERIOR UPPER BACK
LOCATION DETAILED: RIGHT DISTAL POSTERIOR THIGH
LOCATION DETAILED: RIGHT VENTRAL PROXIMAL FOREARM
LOCATION DETAILED: LEFT DISTAL POSTERIOR THIGH
LOCATION DETAILED: LEFT ANTERIOR PROXIMAL UPPER ARM
LOCATION DETAILED: LEFT INFERIOR LATERAL LOWER BACK
LOCATION DETAILED: LEFT MEDIAL SUPERIOR CHEST
LOCATION DETAILED: RIGHT RIB CAGE

## 2025-03-14 ASSESSMENT — LOCATION SIMPLE DESCRIPTION DERM
LOCATION SIMPLE: CHEST
LOCATION SIMPLE: LEFT UPPER BACK
LOCATION SIMPLE: LEFT ANTERIOR NECK
LOCATION SIMPLE: RIGHT UPPER BACK
LOCATION SIMPLE: LEFT LOWER BACK
LOCATION SIMPLE: LEFT POSTERIOR THIGH
LOCATION SIMPLE: ABDOMEN
LOCATION SIMPLE: RIGHT POSTERIOR THIGH
LOCATION SIMPLE: RIGHT FOREARM
LOCATION SIMPLE: RIGHT SHOULDER
LOCATION SIMPLE: LEFT UPPER ARM

## 2025-03-14 NOTE — PROCEDURE: LIQUID NITROGEN
Medical Necessity Information: It is in your best interest to select a reason for this procedure from the list below. All of these items fulfill various CMS LCD requirements except the new and changing color options.
Number Of Freeze-Thaw Cycles: 3 freeze-thaw cycles
Show Topical Anesthesia Variable?: Yes
Render Note In Bullet Format When Appropriate: No
Medical Necessity Clause: This procedure was medically necessary because the lesions that were treated were:
Consent: The patient's consent was obtained including but not limited to risks of crusting, scabbing, blistering, scarring, darker or lighter pigmentary change, recurrence, incomplete removal and infection.
Spray Paint Text: The liquid nitrogen was applied to the skin utilizing a spray paint frosting technique.
Detail Level: Simple
Post-Care Instructions: I reviewed with the patient in detail post-care instructions. Patient is to wear sunprotection, and avoid picking at any of the treated lesions. Pt may apply Vaseline to crusted or scabbing areas.

## 2025-03-20 ENCOUNTER — APPOINTMENT (OUTPATIENT)
Dept: URBAN - METROPOLITAN AREA CLINIC 126 | Facility: CLINIC | Age: 75
Setting detail: DERMATOLOGY
End: 2025-03-20

## 2025-03-20 DIAGNOSIS — Z41.9 ENCOUNTER FOR PROCEDURE FOR PURPOSES OTHER THAN REMEDYING HEALTH STATE, UNSPECIFIED: ICD-10-CM

## 2025-03-20 PROCEDURE — ? DERMAPLANE

## 2025-03-20 PROCEDURE — ? SIGNATURE HYDRAFACIAL

## 2025-03-20 PROCEDURE — ? CHEMICAL PEEL

## 2025-03-20 ASSESSMENT — LOCATION ZONE DERM
LOCATION ZONE: TRUNK
LOCATION ZONE: FACE

## 2025-03-20 ASSESSMENT — LOCATION SIMPLE DESCRIPTION DERM
LOCATION SIMPLE: UPPER BACK
LOCATION SIMPLE: LEFT CHEEK

## 2025-03-20 ASSESSMENT — LOCATION DETAILED DESCRIPTION DERM
LOCATION DETAILED: LEFT INFERIOR CENTRAL MALAR CHEEK
LOCATION DETAILED: SUPERIOR THORACIC SPINE

## 2025-03-20 NOTE — PROCEDURE: CHEMICAL PEEL
Treatment Time (Optional): 3 min
Number Of Layers: 3
Prep: The treated area was degreased with pre-peel cleanser, and vaseline was applied for protection of mucous membranes.
Post Peel Care: . Sun protection and post-care instructions were reviewed with the patient.
Treatment Number: 0
Consent: Prior to the procedure, written consent was obtained and risks were reviewed, including but not limited to: redness, peeling, blistering, pigmentary change, scarring, infection, and pain.
Post-Care Instructions: I reviewed with the patient in detail post-care instructions. Patient should avoid sun exposure and wear sun protection.
Chemical Peel: [employer] Micropeel Sensitive Skin Solution
Detail Level: Zone
Price (Use Numbers Only, No Special Characters Or $): 125

## 2025-03-20 NOTE — PROCEDURE: SIGNATURE HYDRAFACIAL
Number Of Passes: 0
Indication: skin texture
Procedure: Extend and Protect
Solution: Beta-HD
Consent: Written consent obtained, risks reviewed including but not limited to crusting, scabbing, blistering, scarring, darker or lighter pigmentary change, bruising, and/or incomplete response.
Solution: Activ-4
Tip Override
Tip: Hydropeel Tip, Clear
Solution Override
Procedure: Peel
Post-Care Instructions: I reviewed with the patient in detail post-care instructions. Patient should stay away from the sun and wear sun protection until treated areas are fully healed.
Tip: Hydropeel Tip, Teal
Procedure: Boost
Procedure: Fusion
Treatment Number: 1
Location: face
Tip: Hydropeel Tip, Blue
Solution: GlySal 7.5%
Procedure: Exfoliation
Procedure: Extraction
Price (Use Numbers Only, No Special Characters Or $): 175

## 2025-03-20 NOTE — PROCEDURE: DERMAPLANE
Pre-Procedure Text: The patient was placed in a recumbant position on the procedure table.
Treatment Areas: face
Blade: [place] scalpel
Detail Level: Zone
Treatment Area Prep: alcohol
Price (Use Numbers Only, No Special Characters Or $): 60
Post-Procedure Instructions: Following the dermaplane procedure, Oxymist treatment was applied to the treatment areas. Moisturizer and SPF was applied.
Post-Care Instructions: I reviewed with the patient in detail post-care instructions.

## 2025-04-03 ENCOUNTER — APPOINTMENT (OUTPATIENT)
Dept: URBAN - METROPOLITAN AREA CLINIC 126 | Facility: CLINIC | Age: 75
Setting detail: DERMATOLOGY
End: 2025-04-03

## 2025-04-03 DIAGNOSIS — Z41.9 ENCOUNTER FOR PROCEDURE FOR PURPOSES OTHER THAN REMEDYING HEALTH STATE, UNSPECIFIED: ICD-10-CM

## 2025-04-03 PROCEDURE — ? SIGNATURE HYDRAFACIAL

## 2025-04-03 PROCEDURE — ? CHEMICAL PEEL

## 2025-04-03 PROCEDURE — ? DERMAPLANE

## 2025-04-03 ASSESSMENT — LOCATION SIMPLE DESCRIPTION DERM
LOCATION SIMPLE: LEFT UPPER BACK
LOCATION SIMPLE: RIGHT CHEEK

## 2025-04-03 ASSESSMENT — LOCATION DETAILED DESCRIPTION DERM
LOCATION DETAILED: LEFT SUPERIOR MEDIAL UPPER BACK
LOCATION DETAILED: RIGHT INFERIOR CENTRAL MALAR CHEEK

## 2025-04-03 ASSESSMENT — LOCATION ZONE DERM
LOCATION ZONE: TRUNK
LOCATION ZONE: FACE

## 2025-04-03 NOTE — PROCEDURE: CHEMICAL PEEL
Post-Care Instructions: I reviewed with the patient in detail post-care instructions. Patient should avoid sun exposure and wear sun protection.
Price (Use Numbers Only, No Special Characters Or $): 125
Chemical Peel: [place] 20%
Prep: The treated area was degreased with pre-peel cleanser, and vaseline was applied for protection of mucous membranes.
Post Peel Care: . Sun protection and post-care instructions were reviewed with the patient.
Detail Level: Zone
Consent: Prior to the procedure, written consent was obtained and risks were reviewed, including but not limited to: redness, peeling, blistering, pigmentary change, scarring, infection, and pain.
Treatment Number: 0
Treatment Time (Optional): 3 min
Number Of Layers: 3

## 2025-04-03 NOTE — PROCEDURE: DERMAPLANE
Treatment Area Prep: alcohol
Treatment Areas: face
Price (Use Numbers Only, No Special Characters Or $): 60
Detail Level: Zone
Post-Care Instructions: I reviewed with the patient in detail post-care instructions.
Pre-Procedure Text: The patient was placed in a recumbant position on the procedure table.
Post-Procedure Instructions: Following the dermaplane procedure, Oxymist treatment was applied to the treatment areas. Moisturizer and SPF was applied.
Blade: [place] scalpel

## 2025-04-03 NOTE — PROCEDURE: SIGNATURE HYDRAFACIAL
Procedure: Boost
Procedure: Extend and Protect
Vacuum Pressure Low Setting (Will Not Render If Set To 0): 0
Location: face
Price (Use Numbers Only, No Special Characters Or $): 175
Solution Override
Solution: GlySal 7.5%
Solution: Activ-4
Indication: skin texture
Procedure: Extraction
Tip: Hydropeel Tip, Clear
Tip: Hydropeel Tip, Teal
Consent: Written consent obtained, risks reviewed including but not limited to crusting, scabbing, blistering, scarring, darker or lighter pigmentary change, bruising, and/or incomplete response.
Post-Care Instructions: I reviewed with the patient in detail post-care instructions. Patient should stay away from the sun and wear sun protection until treated areas are fully healed.
Treatment Number: 1
Procedure: Fusion
Solution: Beta-HD
Tip: Hydropeel Tip, Blue
Tip Override
Procedure: Peel
Procedure: Exfoliation

## 2025-05-01 ENCOUNTER — APPOINTMENT (OUTPATIENT)
Dept: URBAN - METROPOLITAN AREA CLINIC 126 | Facility: CLINIC | Age: 75
Setting detail: DERMATOLOGY
End: 2025-05-01

## 2025-05-01 DIAGNOSIS — Z41.9 ENCOUNTER FOR PROCEDURE FOR PURPOSES OTHER THAN REMEDYING HEALTH STATE, UNSPECIFIED: ICD-10-CM

## 2025-05-01 PROCEDURE — ? CHEMICAL PEEL

## 2025-05-01 PROCEDURE — ? SIGNATURE HYDRAFACIAL

## 2025-05-01 PROCEDURE — ? DERMAPLANE

## 2025-05-01 ASSESSMENT — LOCATION DETAILED DESCRIPTION DERM
LOCATION DETAILED: SUPERIOR THORACIC SPINE
LOCATION DETAILED: LEFT CENTRAL MALAR CHEEK

## 2025-05-01 ASSESSMENT — LOCATION ZONE DERM
LOCATION ZONE: TRUNK
LOCATION ZONE: FACE

## 2025-05-01 ASSESSMENT — LOCATION SIMPLE DESCRIPTION DERM
LOCATION SIMPLE: LEFT CHEEK
LOCATION SIMPLE: UPPER BACK

## 2025-05-01 NOTE — PROCEDURE: CHEMICAL PEEL
Treatment Number: 0
Prep: The treated area was degreased with pre-peel cleanser, and vaseline was applied for protection of mucous membranes.
Consent: Prior to the procedure, written consent was obtained and risks were reviewed, including but not limited to: redness, peeling, blistering, pigmentary change, scarring, infection, and pain.
Chemical Peel: Micropeel 30 Solution
Detail Level: Zone
Number Of Layers: 3
Post Peel Care: . Sun protection and post-care instructions were reviewed with the patient.
Price (Use Numbers Only, No Special Characters Or $): 125
Post-Care Instructions: I reviewed with the patient in detail post-care instructions. Patient should avoid sun exposure and wear sun protection.
Treatment Time (Optional): 3 min

## 2025-05-01 NOTE — PROCEDURE: SIGNATURE HYDRAFACIAL
Vacuum Pressure High Setting (Will Not Render If Set To 0): 0
Solution Override
Treatment Number: 1
Procedure: Fusion
Price (Use Numbers Only, No Special Characters Or $): 185
Tip: Hydropeel Tip, Blue
Indication: skin texture
Tip Override
Procedure: Exfoliation
Solution: GlySal 7.5%
Procedure: Extend and Protect
Tip: Hydropeel Tip, Clear
Tip: Hydropeel Tip, Teal
Location: face
Consent: Written consent obtained, risks reviewed including but not limited to crusting, scabbing, blistering, scarring, darker or lighter pigmentary change, bruising, and/or incomplete response.
Procedure: Extraction
Solution: Activ-4
Procedure: Boost
Procedure: Peel
Post-Care Instructions: I reviewed with the patient in detail post-care instructions. Patient should stay away from the sun and wear sun protection until treated areas are fully healed.
Solution: Beta-HD

## 2025-05-01 NOTE — PROCEDURE: DERMAPLANE
Post-Procedure Instructions: Following the dermaplane procedure, Oxymist treatment was applied to the treatment areas. Moisturizer and SPF was applied.
Blade: [place] scalpel
Treatment Area Prep: alcohol
Price (Use Numbers Only, No Special Characters Or $): 60
Detail Level: Zone
Pre-Procedure Text: The patient was placed in a recumbant position on the procedure table.
Treatment Areas: face
Post-Care Instructions: I reviewed with the patient in detail post-care instructions.